# Patient Record
Sex: FEMALE | Race: WHITE | NOT HISPANIC OR LATINO | Employment: PART TIME | URBAN - METROPOLITAN AREA
[De-identification: names, ages, dates, MRNs, and addresses within clinical notes are randomized per-mention and may not be internally consistent; named-entity substitution may affect disease eponyms.]

---

## 2023-01-18 PROBLEM — R73.01 ABNORMAL FASTING GLUCOSE: Status: ACTIVE | Noted: 2023-01-18

## 2023-01-18 PROBLEM — G62.0 CHEMOTHERAPY-INDUCED NEUROPATHY (MULTI): Status: ACTIVE | Noted: 2023-01-18

## 2023-01-18 PROBLEM — F98.8 ADD (ATTENTION DEFICIT DISORDER): Status: ACTIVE | Noted: 2023-01-18

## 2023-01-18 PROBLEM — M54.9 BILATERAL BACK PAIN: Status: ACTIVE | Noted: 2023-01-18

## 2023-01-18 PROBLEM — T45.1X5A CHEMOTHERAPY-INDUCED NEUROPATHY (MULTI): Status: ACTIVE | Noted: 2023-01-18

## 2023-01-18 PROBLEM — R93.89 ABNORMAL CAT SCAN: Status: ACTIVE | Noted: 2023-01-18

## 2023-02-07 PROBLEM — G89.28 OTHER CHRONIC POSTPROCEDURAL PAIN: Status: ACTIVE | Noted: 2023-02-07

## 2023-02-07 PROBLEM — R39.9 UTI SYMPTOMS: Status: ACTIVE | Noted: 2023-02-07

## 2023-02-07 PROBLEM — G89.3 CHRONIC PAIN DUE TO NEOPLASM: Status: ACTIVE | Noted: 2023-02-07

## 2023-02-07 PROBLEM — R20.2 PARESTHESIA: Status: ACTIVE | Noted: 2023-02-07

## 2023-02-07 PROBLEM — M79.18 MYOFASCIAL PAIN SYNDROME: Status: ACTIVE | Noted: 2023-02-07

## 2023-02-07 PROBLEM — H53.9 EPISODE OF VISUAL DISTURBANCE: Status: ACTIVE | Noted: 2023-02-07

## 2023-02-07 PROBLEM — E03.9 HYPOTHYROIDISM: Status: ACTIVE | Noted: 2023-02-07

## 2023-02-07 PROBLEM — R29.898 WEAKNESS OF BOTH HIPS: Status: ACTIVE | Noted: 2023-02-07

## 2023-02-07 PROBLEM — M25.50 JOINT PAIN: Status: ACTIVE | Noted: 2023-02-07

## 2023-02-07 PROBLEM — E78.5 DYSLIPIDEMIA: Status: ACTIVE | Noted: 2023-02-07

## 2023-02-07 PROBLEM — N89.8 VAGINAL DISCHARGE: Status: ACTIVE | Noted: 2023-02-07

## 2023-02-07 RX ORDER — TRAMADOL HYDROCHLORIDE 100 MG/1
1 TABLET, COATED ORAL 4 TIMES DAILY
COMMUNITY
Start: 2021-07-12 | End: 2023-12-06 | Stop reason: WASHOUT

## 2023-02-07 RX ORDER — MULTIVITAMIN
TABLET ORAL
COMMUNITY

## 2023-02-07 RX ORDER — LEVOTHYROXINE SODIUM 50 UG/1
1 TABLET ORAL DAILY
COMMUNITY
Start: 2015-03-10 | End: 2023-04-25 | Stop reason: SDUPTHER

## 2023-02-07 RX ORDER — NALOXONE HYDROCHLORIDE 4 MG/.1ML
4 SPRAY NASAL AS NEEDED
COMMUNITY
Start: 2022-02-23 | End: 2023-12-06 | Stop reason: WASHOUT

## 2023-02-07 RX ORDER — LACTULOSE 10 G/15ML
SOLUTION ORAL
COMMUNITY
Start: 2022-05-17 | End: 2023-02-11 | Stop reason: ENTERED-IN-ERROR

## 2023-02-07 RX ORDER — CARISOPRODOL 350 MG/1
1 TABLET ORAL 3 TIMES DAILY PRN
COMMUNITY
Start: 2022-08-18 | End: 2023-12-06 | Stop reason: WASHOUT

## 2023-02-11 RX ORDER — LACTULOSE 10 G/15ML
SOLUTION ORAL
COMMUNITY
End: 2023-12-06 | Stop reason: WASHOUT

## 2023-03-06 ENCOUNTER — OFFICE VISIT (OUTPATIENT)
Dept: PRIMARY CARE | Facility: CLINIC | Age: 53
End: 2023-03-06
Payer: MEDICAID

## 2023-03-06 VITALS
TEMPERATURE: 97.1 F | HEIGHT: 61 IN | OXYGEN SATURATION: 97 % | WEIGHT: 158.6 LBS | DIASTOLIC BLOOD PRESSURE: 80 MMHG | RESPIRATION RATE: 16 BRPM | BODY MASS INDEX: 29.94 KG/M2 | SYSTOLIC BLOOD PRESSURE: 118 MMHG

## 2023-03-06 DIAGNOSIS — Z13.29 SCREENING FOR ENDOCRINE, METABOLIC AND IMMUNITY DISORDER: ICD-10-CM

## 2023-03-06 DIAGNOSIS — Z23 NEED FOR SHINGLES VACCINE: ICD-10-CM

## 2023-03-06 DIAGNOSIS — T45.1X5A CHEMOTHERAPY-INDUCED NEUROPATHY (MULTI): ICD-10-CM

## 2023-03-06 DIAGNOSIS — Z12.11 COLON CANCER SCREENING: ICD-10-CM

## 2023-03-06 DIAGNOSIS — Z13.228 SCREENING FOR ENDOCRINE, METABOLIC AND IMMUNITY DISORDER: ICD-10-CM

## 2023-03-06 DIAGNOSIS — R53.83 OTHER FATIGUE: ICD-10-CM

## 2023-03-06 DIAGNOSIS — Z23 NEED FOR DIPHTHERIA-TETANUS-PERTUSSIS (TDAP) VACCINE: ICD-10-CM

## 2023-03-06 DIAGNOSIS — G62.0 CHEMOTHERAPY-INDUCED NEUROPATHY (MULTI): ICD-10-CM

## 2023-03-06 DIAGNOSIS — Z13.220 SCREENING, LIPID: ICD-10-CM

## 2023-03-06 DIAGNOSIS — R19.5 POSITIVE COLORECTAL CANCER SCREENING USING COLOGUARD TEST: ICD-10-CM

## 2023-03-06 DIAGNOSIS — Z13.0 SCREENING FOR ENDOCRINE, METABOLIC AND IMMUNITY DISORDER: ICD-10-CM

## 2023-03-06 DIAGNOSIS — Z00.00 WELLNESS EXAMINATION: Primary | ICD-10-CM

## 2023-03-06 DIAGNOSIS — E03.9 ACQUIRED HYPOTHYROIDISM: ICD-10-CM

## 2023-03-06 DIAGNOSIS — Z11.59 ENCOUNTER FOR HEPATITIS C SCREENING TEST FOR LOW RISK PATIENT: ICD-10-CM

## 2023-03-06 PROCEDURE — 99396 PREV VISIT EST AGE 40-64: CPT | Performed by: STUDENT IN AN ORGANIZED HEALTH CARE EDUCATION/TRAINING PROGRAM

## 2023-03-06 PROCEDURE — 1036F TOBACCO NON-USER: CPT | Performed by: STUDENT IN AN ORGANIZED HEALTH CARE EDUCATION/TRAINING PROGRAM

## 2023-03-06 RX ORDER — CLOSTRIDIUM TETANI TOXOID ANTIGEN (FORMALDEHYDE INACTIVATED), CORYNEBACTERIUM DIPHTHERIAE TOXOID ANTIGEN (FORMALDEHYDE INACTIVATED), BORDETELLA PERTUSSIS TOXOID ANTIGEN (GLUTARALDEHYDE INACTIVATED), BORDETELLA PERTUSSIS FILAMENTOUS HEMAGGLUTININ ANTIGEN (FORMALDEHYDE INACTIVATED), BORDETELLA PERTUSSIS PERTACTIN ANTIGEN, AND BORDETELLA PERTUSSIS FIMBRIAE 2/3 ANTIGEN 5; 2; 2.5; 5; 3; 5 [LF]/.5ML; [LF]/.5ML; UG/.5ML; UG/.5ML; UG/.5ML; UG/.5ML
0.5 INJECTION, SUSPENSION INTRAMUSCULAR ONCE
Qty: 0.5 ML | Refills: 0 | Status: SHIPPED | OUTPATIENT
Start: 2023-03-06 | End: 2023-03-06

## 2023-03-06 ASSESSMENT — ENCOUNTER SYMPTOMS
COUGH: 0
FATIGUE: 1
FEVER: 0
SHORTNESS OF BREATH: 0
DYSPHORIC MOOD: 0
PALPITATIONS: 0
ACTIVITY CHANGE: 0
ARTHRALGIAS: 1

## 2023-03-06 ASSESSMENT — PATIENT HEALTH QUESTIONNAIRE - PHQ9
2. FEELING DOWN, DEPRESSED OR HOPELESS: NOT AT ALL
1. LITTLE INTEREST OR PLEASURE IN DOING THINGS: NOT AT ALL
SUM OF ALL RESPONSES TO PHQ9 QUESTIONS 1 AND 2: 0

## 2023-03-06 ASSESSMENT — PAIN SCALES - GENERAL: PAINLEVEL: 4

## 2023-03-06 NOTE — ASSESSMENT & PLAN NOTE
Cologuard ordered 3/6/23  Pap- s/p hysterectomy  Mammogram- s/p mastectomy  Labs- ordered 3/6/23 including TSH  Ordered shingles and Tdap for patient  Declines flu and COVID

## 2023-03-06 NOTE — PROGRESS NOTES
"Subjective   Patient ID: America Tang is a 52 y.o. female who presents for No chief complaint on file..    HPI     53 yo female presents for annual wellness  Overall feels she is doing well no concerns today  Is est with pain management who is caring for her residual neuropathy s/p chemo for breast cancer  Would like to get updated on labs and any testing    Review of Systems   Constitutional:  Positive for fatigue. Negative for activity change and fever.   HENT:  Negative for congestion.    Eyes:         Far sighted   Respiratory:  Negative for cough and shortness of breath.    Cardiovascular:  Negative for chest pain, palpitations and leg swelling.   Endocrine: Negative for polyuria.   Musculoskeletal:  Positive for arthralgias.   Neurological:         Chemo induced neuropathy   Psychiatric/Behavioral:  Negative for dysphoric mood.        Objective   /80 (BP Location: Right arm, Patient Position: Sitting)   Temp 36.2 °C (97.1 °F) (Skin)   Resp 16   Ht 1.549 m (5' 1\")   Wt 71.9 kg (158 lb 9.6 oz)   SpO2 97%   BMI 29.97 kg/m²     Physical Exam  Constitutional:       Appearance: Normal appearance.   HENT:      Head: Normocephalic and atraumatic.      Right Ear: Tympanic membrane normal.      Left Ear: Tympanic membrane normal.      Mouth/Throat:      Mouth: Mucous membranes are moist.   Eyes:      Extraocular Movements: Extraocular movements intact.      Pupils: Pupils are equal, round, and reactive to light.   Cardiovascular:      Rate and Rhythm: Normal rate and regular rhythm.   Pulmonary:      Effort: Pulmonary effort is normal. No respiratory distress.   Musculoskeletal:         General: No swelling.   Skin:     Coloration: Skin is not jaundiced or pale.   Neurological:      General: No focal deficit present.      Mental Status: She is alert and oriented to person, place, and time.       Assessment/Plan     Wellness/ need for immunization/encounter screening colon cancer/encounter screening " labs  Cologuard ordered 3/6/23  Pap- s/p hysterectomy  Mammogram- s/p mastectomy  Labs- ordered 3/6/23 including TSH  Ordered shingles and Tdap for patient  Declines flu and COVID    Hypothyroidism- update labs, titrate levothyroxine as appropriate    Chemo induced neuropathy-Patient est with pain management handling rx for tramadol and soma  UDS updated 2/2023      Follow up in 6 months sooner if any issues

## 2023-03-25 LAB — NONINV COLON CA DNA+OCC BLD SCRN STL QL: POSITIVE

## 2023-03-27 ENCOUNTER — TELEPHONE (OUTPATIENT)
Dept: PRIMARY CARE | Facility: CLINIC | Age: 53
End: 2023-03-27
Payer: MEDICAID

## 2023-03-27 NOTE — TELEPHONE ENCOUNTER
----- Message from Kriss Oconnell DO sent at 3/26/2023  7:30 AM EDT -----  Cologuard testing returned positive. What this means is there was an abnormality detected, since cologuard cannot directly visuallize it cannot tell us what the concern is (blood, polyps, etc) so we need to perform a colonoscopy now to follow up on this positive result and figure out what is triggering the positive result. Ordered.

## 2023-04-25 ENCOUNTER — TELEPHONE (OUTPATIENT)
Dept: PRIMARY CARE | Facility: CLINIC | Age: 53
End: 2023-04-25
Payer: MEDICAID

## 2023-04-25 DIAGNOSIS — E03.9 ACQUIRED HYPOTHYROIDISM: Primary | ICD-10-CM

## 2023-04-26 RX ORDER — LEVOTHYROXINE SODIUM 50 UG/1
50 TABLET ORAL
Qty: 90 TABLET | Refills: 1 | Status: SHIPPED | OUTPATIENT
Start: 2023-04-26 | End: 2023-09-06 | Stop reason: WASHOUT

## 2023-05-12 LAB — ALCOHOL (MG/DL) IN URINE: <10 MG/DL

## 2023-05-15 LAB
11-NOR-9-CARBOXY-THC, URN, QUANT: 94 NG/ML
ETHYL GLUCURONIDE SCREEN: POSITIVE NG/ML

## 2023-05-16 LAB
6-ACETYLMORPHINE: <25 NG/ML
7-AMINOCLONAZEPAM: <25 NG/ML
ALPHA-HYDROXYALPRAZOLAM: <25 NG/ML
ALPHA-HYDROXYMIDAZOLAM: <25 NG/ML
ALPRAZOLAM: <25 NG/ML
AMPHETAMINE (PRESENCE) IN URINE BY SCREEN METHOD: ABNORMAL
BARBITURATES PRESENCE IN URINE BY SCREEN METHOD: ABNORMAL
CANNABINOIDS IN URINE BY SCREEN METHOD: ABNORMAL
CHLORDIAZEPOXIDE: <25 NG/ML
CLONAZEPAM: <25 NG/ML
COCAINE (PRESENCE) IN URINE BY SCREEN METHOD: ABNORMAL
CODEINE: <50 NG/ML
CREATINE, URINE FOR DRUG: 198.6 MG/DL
DIAZEPAM: <25 NG/ML
DRUG SCREEN COMMENT URINE: ABNORMAL
EDDP: <25 NG/ML
FENTANYL CONFIRMATION, URINE: <2.5 NG/ML
HYDROCODONE: <25 NG/ML
HYDROMORPHONE: <25 NG/ML
LORAZEPAM: <25 NG/ML
METHADONE CONFIRMATION,URINE: <25 NG/ML
MIDAZOLAM: <25 NG/ML
MORPHINE URINE: <50 NG/ML
N-DESMETHYLTAPENTADOL: <25 NG/ML
NORDIAZEPAM: <25 NG/ML
NORFENTANYL: <2.5 NG/ML
NORHYDROCODONE: <25 NG/ML
NOROXYCODONE: <25 NG/ML
O-DESMETHYLTRAMADOL: >1000 NG/ML
OXAZEPAM: <25 NG/ML
OXYCODONE: <25 NG/ML
OXYMORPHONE: <25 NG/ML
PHENCYCLIDINE (PRESENCE) IN URINE BY SCREEN METHOD: ABNORMAL
TAPENTADOL: <25 NG/ML
TEMAZEPAM: <25 NG/ML
TRAMADOL: >1000 NG/ML
ZOLPIDEM METABOLITE (ZCA): <25 NG/ML
ZOLPIDEM: <25 NG/ML

## 2023-05-17 LAB
BUPRENORPHINE ,URINE: <2 NG/ML
BUPRENORPHINE GLUC, URINE: <5 NG/ML
ETHYL GLUCURONIDE: 2032 NG/ML
ETHYL SULFATE,U: 1719 NG/ML
NALOXONE, URINE: <100 NG/ML
NORBUPRENORPHINE GLUC,URINE: <5 NG/ML
NORBUPRENORPHINE, URINE: <2 NG/ML

## 2023-08-03 ENCOUNTER — PATIENT OUTREACH (OUTPATIENT)
Dept: PRIMARY CARE | Facility: CLINIC | Age: 53
End: 2023-08-03
Payer: MEDICAID

## 2023-08-04 NOTE — PROGRESS NOTES
Discharge Facility: [facility]   Discharge Diagnosis:  Final Discharge Diagnoses: Impression 1: Paroxysmal supraventricular tachycardia  Impression 2: Elevated troponin  Impression 3: Peripheral neuropathy  Impression 4: Chronic pain  Impression 5: DVT prophylaxis     Admission Date:  8-1-23  Discharge Date: 8-2-23        2 attempts were made to reach patient to assess needs.   No return call as of this note.   If patient schedules follow up within 14 days of discharge, visit is TCM billable.  Message sent to practice clinical pool to reach out to patient and schedule an appointment within 7-13 days from discharge date.    If patient meets criteria for moderately complex & has follow-up within 14 days-can bill [phone].   If patient meets criteria for highly complex & has follow-up visit within 7 days-can bill [phone].    *virtual follow up needs modifier added (95 or GT)   *AWV AND TCM CAN BE BILLED TOGETHER WITH 25 MODIFIER    Specialist Appointment Date:   Hospital Encounter and Summary: Linked  See discharge assessment below for further details  Reggie Sales LPN

## 2023-08-09 LAB — ALCOHOL (MG/DL) IN URINE: 43 MG/DL

## 2023-08-14 LAB
6-ACETYLMORPHINE: <25 NG/ML
7-AMINOCLONAZEPAM: <25 NG/ML
ALPHA-HYDROXYALPRAZOLAM: <25 NG/ML
ALPHA-HYDROXYMIDAZOLAM: <25 NG/ML
ALPRAZOLAM: <25 NG/ML
AMPHETAMINE (PRESENCE) IN URINE BY SCREEN METHOD: ABNORMAL
BARBITURATES PRESENCE IN URINE BY SCREEN METHOD: ABNORMAL
BUPRENORPHINE ,URINE: <2 NG/ML
BUPRENORPHINE GLUC, URINE: <5 NG/ML
CANNABINOIDS IN URINE BY SCREEN METHOD: ABNORMAL
CHLORDIAZEPOXIDE: <25 NG/ML
CLONAZEPAM: <25 NG/ML
COCAINE (PRESENCE) IN URINE BY SCREEN METHOD: ABNORMAL
CODEINE: <50 NG/ML
CREATINE, URINE FOR DRUG: 191 MG/DL
DIAZEPAM: <25 NG/ML
DRUG SCREEN COMMENT URINE: ABNORMAL
EDDP: <25 NG/ML
FENTANYL CONFIRMATION, URINE: <2.5 NG/ML
HYDROCODONE: <25 NG/ML
HYDROMORPHONE: <25 NG/ML
LORAZEPAM: <25 NG/ML
METHADONE CONFIRMATION,URINE: <25 NG/ML
MIDAZOLAM: <25 NG/ML
MORPHINE URINE: <50 NG/ML
N-DESMETHYLTAPENTADOL: <25 NG/ML
NALOXONE, URINE: <100 NG/ML
NORBUPRENORPHINE GLUC,URINE: <5 NG/ML
NORBUPRENORPHINE, URINE: <2 NG/ML
NORDIAZEPAM: <25 NG/ML
NORFENTANYL: <2.5 NG/ML
NORHYDROCODONE: <25 NG/ML
NOROXYCODONE: <25 NG/ML
O-DESMETHYLTRAMADOL: >1000 NG/ML
OXAZEPAM: <25 NG/ML
OXYCODONE: <25 NG/ML
OXYMORPHONE: <25 NG/ML
PHENCYCLIDINE (PRESENCE) IN URINE BY SCREEN METHOD: ABNORMAL
TAPENTADOL: <25 NG/ML
TEMAZEPAM: <25 NG/ML
TRAMADOL: >1000 NG/ML
ZOLPIDEM METABOLITE (ZCA): <25 NG/ML
ZOLPIDEM: <25 NG/ML

## 2023-08-18 ENCOUNTER — PATIENT OUTREACH (OUTPATIENT)
Dept: PRIMARY CARE | Facility: CLINIC | Age: 53
End: 2023-08-18
Payer: MEDICAID

## 2023-08-18 NOTE — PROGRESS NOTES
Unable to reach patient for call back after patient's follow up appointment with PCP.   LVM with call back number for patient to call if needed   If no voicemail available call attempts x 2 were made to contact the patient to assist with any questions or concerns patient may have.    Reggie Sales LPN

## 2023-08-28 ENCOUNTER — LAB (OUTPATIENT)
Dept: LAB | Facility: LAB | Age: 53
End: 2023-08-28
Payer: MEDICAID

## 2023-08-28 DIAGNOSIS — Z13.228 SCREENING FOR ENDOCRINE, METABOLIC AND IMMUNITY DISORDER: ICD-10-CM

## 2023-08-28 DIAGNOSIS — Z13.220 SCREENING, LIPID: ICD-10-CM

## 2023-08-28 DIAGNOSIS — R53.83 OTHER FATIGUE: ICD-10-CM

## 2023-08-28 DIAGNOSIS — Z11.59 ENCOUNTER FOR HEPATITIS C SCREENING TEST FOR LOW RISK PATIENT: ICD-10-CM

## 2023-08-28 DIAGNOSIS — Z13.29 SCREENING FOR ENDOCRINE, METABOLIC AND IMMUNITY DISORDER: ICD-10-CM

## 2023-08-28 DIAGNOSIS — Z13.0 SCREENING FOR ENDOCRINE, METABOLIC AND IMMUNITY DISORDER: ICD-10-CM

## 2023-08-28 DIAGNOSIS — E03.9 ACQUIRED HYPOTHYROIDISM: ICD-10-CM

## 2023-08-28 LAB
ALANINE AMINOTRANSFERASE (SGPT) (U/L) IN SER/PLAS: 50 U/L (ref 7–45)
ALBUMIN (G/DL) IN SER/PLAS: 4.3 G/DL (ref 3.4–5)
ALKALINE PHOSPHATASE (U/L) IN SER/PLAS: 47 U/L (ref 33–110)
ANION GAP IN SER/PLAS: 13 MMOL/L (ref 10–20)
ASPARTATE AMINOTRANSFERASE (SGOT) (U/L) IN SER/PLAS: 61 U/L (ref 9–39)
BILIRUBIN TOTAL (MG/DL) IN SER/PLAS: 0.6 MG/DL (ref 0–1.2)
CALCIUM (MG/DL) IN SER/PLAS: 9.5 MG/DL (ref 8.6–10.3)
CARBON DIOXIDE, TOTAL (MMOL/L) IN SER/PLAS: 28 MMOL/L (ref 21–32)
CHLORIDE (MMOL/L) IN SER/PLAS: 106 MMOL/L (ref 98–107)
CHOLESTEROL (MG/DL) IN SER/PLAS: 238 MG/DL (ref 0–199)
CHOLESTEROL IN HDL (MG/DL) IN SER/PLAS: 82.9 MG/DL
CHOLESTEROL/HDL RATIO: 2.9
COBALAMIN (VITAMIN B12) (PG/ML) IN SER/PLAS: 234 PG/ML (ref 211–911)
CREATININE (MG/DL) IN SER/PLAS: 0.74 MG/DL (ref 0.5–1.05)
ERYTHROCYTE DISTRIBUTION WIDTH (RATIO) BY AUTOMATED COUNT: 13.2 % (ref 11.5–14.5)
ERYTHROCYTE MEAN CORPUSCULAR HEMOGLOBIN CONCENTRATION (G/DL) BY AUTOMATED: 31.7 G/DL (ref 32–36)
ERYTHROCYTE MEAN CORPUSCULAR VOLUME (FL) BY AUTOMATED COUNT: 102 FL (ref 80–100)
ERYTHROCYTES (10*6/UL) IN BLOOD BY AUTOMATED COUNT: 4.05 X10E12/L (ref 4–5.2)
ESTIMATED AVERAGE GLUCOSE FOR HBA1C: 108 MG/DL
GFR FEMALE: >90 ML/MIN/1.73M2
GLUCOSE (MG/DL) IN SER/PLAS: 78 MG/DL (ref 74–99)
HEMATOCRIT (%) IN BLOOD BY AUTOMATED COUNT: 41.3 % (ref 36–46)
HEMOGLOBIN (G/DL) IN BLOOD: 13.1 G/DL (ref 12–16)
HEMOGLOBIN A1C/HEMOGLOBIN TOTAL IN BLOOD: 5.4 %
HEPATITIS C VIRUS AB PRESENCE IN SERUM: NONREACTIVE
LDL: 135 MG/DL (ref 0–99)
LEUKOCYTES (10*3/UL) IN BLOOD BY AUTOMATED COUNT: 4 X10E9/L (ref 4.4–11.3)
PLATELETS (10*3/UL) IN BLOOD AUTOMATED COUNT: 238 X10E9/L (ref 150–450)
POTASSIUM (MMOL/L) IN SER/PLAS: 4 MMOL/L (ref 3.5–5.3)
PROTEIN TOTAL: 6.5 G/DL (ref 6.4–8.2)
SODIUM (MMOL/L) IN SER/PLAS: 143 MMOL/L (ref 136–145)
THYROTROPIN (MIU/L) IN SER/PLAS BY DETECTION LIMIT <= 0.05 MIU/L: 1.08 MIU/L (ref 0.44–3.98)
TRIGLYCERIDE (MG/DL) IN SER/PLAS: 99 MG/DL (ref 0–149)
UREA NITROGEN (MG/DL) IN SER/PLAS: 20 MG/DL (ref 6–23)
VLDL: 20 MG/DL (ref 0–40)

## 2023-08-28 PROCEDURE — 86803 HEPATITIS C AB TEST: CPT

## 2023-08-28 PROCEDURE — 36415 COLL VENOUS BLD VENIPUNCTURE: CPT

## 2023-08-28 PROCEDURE — 85027 COMPLETE CBC AUTOMATED: CPT

## 2023-08-28 PROCEDURE — 80053 COMPREHEN METABOLIC PANEL: CPT

## 2023-08-28 PROCEDURE — 80061 LIPID PANEL: CPT

## 2023-08-28 PROCEDURE — 84443 ASSAY THYROID STIM HORMONE: CPT

## 2023-08-28 PROCEDURE — 82652 VIT D 1 25-DIHYDROXY: CPT

## 2023-08-28 PROCEDURE — 83036 HEMOGLOBIN GLYCOSYLATED A1C: CPT

## 2023-08-28 PROCEDURE — 82607 VITAMIN B-12: CPT

## 2023-09-01 LAB — VITAMIN D 1,25-DIHYDROXY: 39.5 PG/ML (ref 19.9–79.3)

## 2023-09-06 ENCOUNTER — OFFICE VISIT (OUTPATIENT)
Dept: PRIMARY CARE | Facility: CLINIC | Age: 53
End: 2023-09-06
Payer: MEDICAID

## 2023-09-06 VITALS
HEIGHT: 61 IN | BODY MASS INDEX: 28.58 KG/M2 | WEIGHT: 151.4 LBS | SYSTOLIC BLOOD PRESSURE: 118 MMHG | OXYGEN SATURATION: 96 % | HEART RATE: 99 BPM | RESPIRATION RATE: 16 BRPM | DIASTOLIC BLOOD PRESSURE: 78 MMHG

## 2023-09-06 DIAGNOSIS — E03.9 ACQUIRED HYPOTHYROIDISM: ICD-10-CM

## 2023-09-06 DIAGNOSIS — I47.10 SVT (SUPRAVENTRICULAR TACHYCARDIA) (CMS-HCC): Primary | ICD-10-CM

## 2023-09-06 PROBLEM — Z85.3 HX OF BREAST CANCER: Status: ACTIVE | Noted: 2023-09-06

## 2023-09-06 PROCEDURE — 99214 OFFICE O/P EST MOD 30 MIN: CPT | Performed by: STUDENT IN AN ORGANIZED HEALTH CARE EDUCATION/TRAINING PROGRAM

## 2023-09-06 PROCEDURE — 1036F TOBACCO NON-USER: CPT | Performed by: STUDENT IN AN ORGANIZED HEALTH CARE EDUCATION/TRAINING PROGRAM

## 2023-09-06 RX ORDER — LEVOTHYROXINE SODIUM 75 UG/1
75 TABLET ORAL DAILY
Qty: 90 TABLET | Refills: 0 | Status: SHIPPED | OUTPATIENT
Start: 2023-09-06 | End: 2023-12-06 | Stop reason: SDUPTHER

## 2023-09-06 RX ORDER — GABAPENTIN 300 MG/1
300 CAPSULE ORAL
COMMUNITY
Start: 2023-08-09 | End: 2023-12-06 | Stop reason: WASHOUT

## 2023-09-06 RX ORDER — LEVOTHYROXINE SODIUM 50 UG/1
75 TABLET ORAL
Qty: 90 TABLET | Refills: 1 | Status: CANCELLED | OUTPATIENT
Start: 2023-09-06

## 2023-09-06 ASSESSMENT — ANXIETY QUESTIONNAIRES
5. BEING SO RESTLESS THAT IT IS HARD TO SIT STILL: NOT AT ALL
6. BECOMING EASILY ANNOYED OR IRRITABLE: NOT AT ALL
1. FEELING NERVOUS, ANXIOUS, OR ON EDGE: NOT AT ALL
3. WORRYING TOO MUCH ABOUT DIFFERENT THINGS: NOT AT ALL
2. NOT BEING ABLE TO STOP OR CONTROL WORRYING: NOT AT ALL
GAD7 TOTAL SCORE: 0
7. FEELING AFRAID AS IF SOMETHING AWFUL MIGHT HAPPEN: NOT AT ALL
IF YOU CHECKED OFF ANY PROBLEMS ON THIS QUESTIONNAIRE, HOW DIFFICULT HAVE THESE PROBLEMS MADE IT FOR YOU TO DO YOUR WORK, TAKE CARE OF THINGS AT HOME, OR GET ALONG WITH OTHER PEOPLE: NOT DIFFICULT AT ALL
4. TROUBLE RELAXING: NOT AT ALL

## 2023-09-06 ASSESSMENT — ENCOUNTER SYMPTOMS
FACIAL ASYMMETRY: 0
ARTHRALGIAS: 1
DEPRESSION: 0
PALPITATIONS: 0
CONFUSION: 0
SHORTNESS OF BREATH: 0
COUGH: 0
LOSS OF SENSATION IN FEET: 0
OCCASIONAL FEELINGS OF UNSTEADINESS: 0

## 2023-09-06 ASSESSMENT — PATIENT HEALTH QUESTIONNAIRE - PHQ9
2. FEELING DOWN, DEPRESSED OR HOPELESS: NOT AT ALL
SUM OF ALL RESPONSES TO PHQ9 QUESTIONS 1 AND 2: 0
1. LITTLE INTEREST OR PLEASURE IN DOING THINGS: NOT AT ALL

## 2023-09-06 NOTE — ASSESSMENT & PLAN NOTE
Given large change and symptoms requiring hospitalization schedule 3 month recheck  Continue at 75mcg dose

## 2023-09-06 NOTE — PROGRESS NOTES
"Patient Name:  America Tang  MRN:  [id number]  :  1970    Subjective   Patient ID: America Tang is a 52 y.o. female who presents for Follow-up.    HPI     53 yo female presents for follow up    ED visit 23 for SVT seen by EMT  Given x1 dose of adenosine with resolution  Elevated trop  Patient admitted to the hospital with cardiology consulted  Started on metoprolol  Levothyroxine dose changed from 50 to 75  TSH rechecked before this visit and WNL    Has not been taking metoprolol and no further symptoms               Component Ref Range & Units 9 d ago  (23) 1 mo ago  (23) 2 yr ago  (21) 2 yr ago  (20) 3 yr ago  (20) 4 yr ago  (19) 4 yr ago  (18)   TSH 0.44 - 3.98 mIU/L 1.08 23.98 High  CM 9.02 High  CM 55.00 High  CM 7.17 High  CM       Review of Systems   Eyes:  Negative for visual disturbance.   Respiratory:  Negative for cough and shortness of breath.    Cardiovascular:  Negative for chest pain, palpitations and leg swelling.   Musculoskeletal:  Positive for arthralgias.   Allergic/Immunologic: Negative for immunocompromised state.   Neurological:  Negative for facial asymmetry.   Psychiatric/Behavioral:  Negative for confusion.      Objective   /78   Pulse 99   Resp 16   Ht 1.549 m (5' 1\")   Wt 68.7 kg (151 lb 6.4 oz)   SpO2 96%   BMI 28.61 kg/m²     Physical Exam  Constitutional:       Appearance: Normal appearance.   Cardiovascular:      Rate and Rhythm: Normal rate and regular rhythm.   Pulmonary:      Effort: Pulmonary effort is normal.   Neurological:      General: No focal deficit present.      Mental Status: She is alert and oriented to person, place, and time.   Psychiatric:         Mood and Affect: Mood normal.         Behavior: Behavior normal.     Assessment/Plan   Problem List Items Addressed This Visit       Hypothyroidism (Chronic)     Given large change and symptoms requiring hospitalization schedule 3 month recheck  Continue at 75mcg " dose         Relevant Medications    levothyroxine (Synthroid, Levoxyl) 75 mcg tablet    Other Relevant Orders    TSH with reflex to Free T4 if abnormal    Follow Up In [facility] - PCP - Established    SVT (supraventricular tachycardia) (CMS/HCC) - Primary (Chronic)     Continue to closely monitor TSH and levothyroxine dosing  Patient stopped metoprolol  HR borderline in office 99  Close follow up continue, low threshold to add back, patient aware         Relevant Medications    levothyroxine (Synthroid, Levoxyl) 75 mcg tablet    Other Relevant Orders    TSH with reflex to Free T4 if abnormal    Follow Up In [facility] - PCP - Established

## 2023-09-06 NOTE — ASSESSMENT & PLAN NOTE
Continue to closely monitor TSH and levothyroxine dosing  Patient stopped metoprolol  HR borderline in office 99  Close follow up continue, low threshold to add back, patient aware

## 2023-12-06 ENCOUNTER — LAB (OUTPATIENT)
Dept: LAB | Facility: LAB | Age: 53
End: 2023-12-06
Payer: COMMERCIAL

## 2023-12-06 ENCOUNTER — OFFICE VISIT (OUTPATIENT)
Dept: PRIMARY CARE | Facility: CLINIC | Age: 53
End: 2023-12-06
Payer: COMMERCIAL

## 2023-12-06 VITALS
SYSTOLIC BLOOD PRESSURE: 116 MMHG | HEART RATE: 83 BPM | OXYGEN SATURATION: 97 % | DIASTOLIC BLOOD PRESSURE: 70 MMHG | WEIGHT: 164 LBS | RESPIRATION RATE: 16 BRPM | HEIGHT: 61 IN | BODY MASS INDEX: 30.96 KG/M2

## 2023-12-06 DIAGNOSIS — I47.10 SVT (SUPRAVENTRICULAR TACHYCARDIA) (CMS-HCC): ICD-10-CM

## 2023-12-06 DIAGNOSIS — E03.9 ACQUIRED HYPOTHYROIDISM: ICD-10-CM

## 2023-12-06 LAB — TSH SERPL-ACNC: 3.13 MIU/L (ref 0.44–3.98)

## 2023-12-06 PROCEDURE — 99214 OFFICE O/P EST MOD 30 MIN: CPT | Performed by: STUDENT IN AN ORGANIZED HEALTH CARE EDUCATION/TRAINING PROGRAM

## 2023-12-06 PROCEDURE — 1036F TOBACCO NON-USER: CPT | Performed by: STUDENT IN AN ORGANIZED HEALTH CARE EDUCATION/TRAINING PROGRAM

## 2023-12-06 PROCEDURE — 84443 ASSAY THYROID STIM HORMONE: CPT

## 2023-12-06 PROCEDURE — 36415 COLL VENOUS BLD VENIPUNCTURE: CPT

## 2023-12-06 ASSESSMENT — ENCOUNTER SYMPTOMS
OCCASIONAL FEELINGS OF UNSTEADINESS: 0
COUGH: 0
ARTHRALGIAS: 1
LOSS OF SENSATION IN FEET: 0
SHORTNESS OF BREATH: 0
PALPITATIONS: 0
CONFUSION: 0
POLYPHAGIA: 0
DECREASED CONCENTRATION: 0

## 2023-12-06 ASSESSMENT — PATIENT HEALTH QUESTIONNAIRE - PHQ9
1. LITTLE INTEREST OR PLEASURE IN DOING THINGS: NOT AT ALL
SUM OF ALL RESPONSES TO PHQ9 QUESTIONS 1 AND 2: 0
2. FEELING DOWN, DEPRESSED OR HOPELESS: NOT AT ALL

## 2023-12-06 NOTE — PROGRESS NOTES
"Patient Name:  America Tang  MRN:  [id number]  :  1970    Subjective   Patient ID: America Tang is a 53 y.o. female who presents for Follow-up.    HPI     54 yo female presents for follow up on thyroid lab review  Did not complete thyroid labs    No issues with SVT  No palpitations  Not taking BB     Has weaned herself off the tramadol  Has some withdrawal initially but not doing well      Review of Systems   Eyes:  Negative for visual disturbance.   Respiratory:  Negative for cough and shortness of breath.    Cardiovascular:  Negative for chest pain, palpitations and leg swelling.   Endocrine: Negative for cold intolerance, heat intolerance, polyphagia and polyuria.   Musculoskeletal:  Positive for arthralgias.   Allergic/Immunologic: Negative for immunocompromised state.   Psychiatric/Behavioral:  Negative for confusion and decreased concentration.      Objective   /70   Pulse 83   Resp 16   Ht 1.549 m (5' 1\")   Wt 74.4 kg (164 lb)   SpO2 97%   BMI 30.99 kg/m²     Physical Exam  Constitutional:       Appearance: Normal appearance.   HENT:      Head: Normocephalic and atraumatic.   Eyes:      Extraocular Movements: Extraocular movements intact.   Cardiovascular:      Rate and Rhythm: Normal rate and regular rhythm.   Pulmonary:      Effort: Pulmonary effort is normal. No respiratory distress.   Musculoskeletal:      Right lower leg: No edema.      Left lower leg: No edema.   Skin:     Coloration: Skin is not jaundiced or pale.   Neurological:      General: No focal deficit present.      Mental Status: She is alert and oriented to person, place, and time.   Psychiatric:         Mood and Affect: Mood normal.         Behavior: Behavior normal.         Thought Content: Thought content normal.         Judgment: Judgment normal.     Assessment/Plan   Problem List Items Addressed This Visit             ICD-10-CM    Hypothyroidism (Chronic) E03.9    Relevant Orders    Follow Up In [facility] [facility] - PCP - Established    Tsh With Reflex To Free T4 If Abnormal    SVT (supraventricular tachycardia) (Chronic) I47.10     No further symptoms of SVT, HR well controlled in office not on BB    One more recheck of thryoid function if stable move to [id number] checks

## 2023-12-07 ENCOUNTER — TELEPHONE (OUTPATIENT)
Dept: PRIMARY CARE | Facility: CLINIC | Age: 53
End: 2023-12-07
Payer: COMMERCIAL

## 2023-12-14 ENCOUNTER — TELEPHONE (OUTPATIENT)
Dept: SCHEDULING | Age: 53
End: 2023-12-14
Payer: COMMERCIAL

## 2023-12-14 DIAGNOSIS — Z85.3 HX OF BREAST CANCER: ICD-10-CM

## 2023-12-15 ENCOUNTER — TELEPHONE (OUTPATIENT)
Dept: PRIMARY CARE | Facility: CLINIC | Age: 53
End: 2023-12-15
Payer: COMMERCIAL

## 2023-12-15 DIAGNOSIS — E03.9 ACQUIRED HYPOTHYROIDISM: Primary | ICD-10-CM

## 2023-12-18 RX ORDER — LEVOTHYROXINE SODIUM 75 UG/1
75 TABLET ORAL DAILY
Qty: 30 TABLET | Refills: 0 | Status: SHIPPED | OUTPATIENT
Start: 2023-12-18 | End: 2024-01-24 | Stop reason: SDUPTHER

## 2024-01-04 DIAGNOSIS — Z85.3 HX OF BREAST CANCER: Primary | ICD-10-CM

## 2024-01-08 ENCOUNTER — OFFICE VISIT (OUTPATIENT)
Dept: HEMATOLOGY/ONCOLOGY | Facility: CLINIC | Age: 54
End: 2024-01-08
Payer: COMMERCIAL

## 2024-01-08 VITALS
SYSTOLIC BLOOD PRESSURE: 115 MMHG | DIASTOLIC BLOOD PRESSURE: 78 MMHG | RESPIRATION RATE: 16 BRPM | HEIGHT: 61 IN | BODY MASS INDEX: 29.89 KG/M2 | TEMPERATURE: 96.8 F | WEIGHT: 158.29 LBS | OXYGEN SATURATION: 97 % | HEART RATE: 88 BPM

## 2024-01-08 DIAGNOSIS — Z85.3 HX OF BREAST CANCER: ICD-10-CM

## 2024-01-08 LAB
ALBUMIN SERPL BCP-MCNC: 4.2 G/DL (ref 3.4–5)
ALP SERPL-CCNC: 40 U/L (ref 33–110)
ALT SERPL W P-5'-P-CCNC: 8 U/L (ref 7–45)
ANION GAP SERPL CALC-SCNC: 9 MMOL/L (ref 10–20)
AST SERPL W P-5'-P-CCNC: 13 U/L (ref 9–39)
BASOPHILS # BLD AUTO: 0.03 X10*3/UL (ref 0–0.1)
BASOPHILS NFR BLD AUTO: 0.9 %
BILIRUB SERPL-MCNC: 0.6 MG/DL (ref 0–1.2)
BUN SERPL-MCNC: 14 MG/DL (ref 6–23)
CALCIUM SERPL-MCNC: 9 MG/DL (ref 8.6–10.3)
CHLORIDE SERPL-SCNC: 105 MMOL/L (ref 98–107)
CO2 SERPL-SCNC: 29 MMOL/L (ref 21–32)
CREAT SERPL-MCNC: 0.73 MG/DL (ref 0.5–1.05)
EGFRCR SERPLBLD CKD-EPI 2021: >90 ML/MIN/1.73M*2
EOSINOPHIL # BLD AUTO: 0.09 X10*3/UL (ref 0–0.7)
EOSINOPHIL NFR BLD AUTO: 2.8 %
ERYTHROCYTE [DISTWIDTH] IN BLOOD BY AUTOMATED COUNT: 13 % (ref 11.5–14.5)
GLUCOSE SERPL-MCNC: 90 MG/DL (ref 74–99)
HCT VFR BLD AUTO: 38.1 % (ref 36–46)
HGB BLD-MCNC: 12.1 G/DL (ref 12–16)
IMM GRANULOCYTES # BLD AUTO: 0.01 X10*3/UL (ref 0–0.7)
IMM GRANULOCYTES NFR BLD AUTO: 0.3 % (ref 0–0.9)
LYMPHOCYTES # BLD AUTO: 1.01 X10*3/UL (ref 1.2–4.8)
LYMPHOCYTES NFR BLD AUTO: 31.7 %
MCH RBC QN AUTO: 31.1 PG (ref 26–34)
MCHC RBC AUTO-ENTMCNC: 31.8 G/DL (ref 32–36)
MCV RBC AUTO: 98 FL (ref 80–100)
MONOCYTES # BLD AUTO: 0.45 X10*3/UL (ref 0.1–1)
MONOCYTES NFR BLD AUTO: 14.1 %
NEUTROPHILS # BLD AUTO: 1.6 X10*3/UL (ref 1.2–7.7)
NEUTROPHILS NFR BLD AUTO: 50.2 %
PLATELET # BLD AUTO: 241 X10*3/UL (ref 150–450)
POTASSIUM SERPL-SCNC: 3.6 MMOL/L (ref 3.5–5.3)
PROT SERPL-MCNC: 6.9 G/DL (ref 6.4–8.2)
RBC # BLD AUTO: 3.89 X10*6/UL (ref 4–5.2)
SODIUM SERPL-SCNC: 139 MMOL/L (ref 136–145)
WBC # BLD AUTO: 3.2 X10*3/UL (ref 4.4–11.3)

## 2024-01-08 PROCEDURE — 36415 COLL VENOUS BLD VENIPUNCTURE: CPT | Performed by: INTERNAL MEDICINE

## 2024-01-08 PROCEDURE — 1036F TOBACCO NON-USER: CPT | Performed by: INTERNAL MEDICINE

## 2024-01-08 PROCEDURE — 99214 OFFICE O/P EST MOD 30 MIN: CPT | Performed by: INTERNAL MEDICINE

## 2024-01-08 PROCEDURE — 80053 COMPREHEN METABOLIC PANEL: CPT | Performed by: INTERNAL MEDICINE

## 2024-01-08 PROCEDURE — 85025 COMPLETE CBC W/AUTO DIFF WBC: CPT | Performed by: INTERNAL MEDICINE

## 2024-01-08 ASSESSMENT — PAIN SCALES - GENERAL: PAINLEVEL: 3

## 2024-01-08 NOTE — PATIENT INSTRUCTIONS
Return for follow up with Dr. Pantoja in 6 months.     Lab appointments are no longer scheduled. Please arrive at least 15 minutes before scheduled appointment time for blood work.

## 2024-01-08 NOTE — PROGRESS NOTES
Patient Visit Information:   Visit Type: Follow Up Visit      Cancer History:          Breast         AJCC Edition: 7th (AJCC), Diagnosis Date: 09-Sep-2015, IIIA, T3 pN2 M0      Treatment Synopsis:    1. Right breast cancer  Palpable mass in right breast on exam September 17, 2015  Lumpectomy with sentinal lymph nodes, Stage IIIA T3, N2, M0, triple negative  MUGA scan showed low ejection fraction of 47%, 2D echocardiogram with EF 55%  Adjuvant chemotherapy with Adriamycin, Cytoxan x4 followed by Taxotere/carboplatin x4     2. Left breast cancer  Diagnosed July 27, 2009, Stage I, T1b, N0, M0  Triple negative  Partial mastectomy  Adjuvant chemotherapy with Adriamycin, Cytoxan and Taxane     BRCA positive  Underwent hysterectomy and bilateral oophorectomy  Bilateral mastectomy        History of Present Illness:      ID Statement:    AMERICA TANG is a 51 year old Female        Chief Complaint: Office visit for follow-up of breast  cancer.   Interval History:    America Tang is here today for interval follow-up and survivorship visit for breast cancer. She reports she is overall doing well. She has chronic arthralgias and  nerve pain related to bilateral mastectomy - has been following with pain management with good control. She denies any shortness of breath, chest pain, abdominal pain, constipation. No skin changes, itching, rashes. Denies bleeding indications, fevers,  chills, night sweats, unexplained weight loss or any other significant concerns. Reports she completes her own chest wall exams and denies any areas of changes or concern.  Patient was admitted in the hospital in August 2023 because of chest pain workup was negative.  Patient is active doing some regular exercise        Review of Systems:   Review of Systems:    Review of systems:   Constitutional: Feeling well, no weakness, fatigue.  Head and neck: occasional headaches.    Cardiac: no chest pain or palpitations.   Pulmonary: no cough or  shortness of breath.    GI: appetite is fair weight is stable. Denies abdominal pain, n/v, c/d.   : no frequency urgency.    GYN: she is post oophorectomy.    Neuromuscular: tingling and numbness in the fingers and toes.   Musculocutaneous: she has pains in her joints and muscles. chronic and stable.   Endocrine: no heat or cold intolerance.    Skin: no rash or itching        Allergies and Intolerances:       Allergies:         Bactrim: Drug, Rash, Itching, Active         sulfamethoxazole: Drug, Unknown, Active         sulfa drugs: Drug Category, Unknown, Active     Outpatient Medication Profile:  * Patient Currently Takes Medications as of 16-May-2022 16:08 documented in Structured Notes         lactulose 10 g/15 mL oral and rectal liquid : 30 milliliter(s) orally 1 to 2 times a day, As Needed , Start Date: 17-May-2022         traMADol 100 mg oral tablet: 1 tab(s) orally every 6 hours as needed  x 30 days , Start Date: 25-Jan-2022         levothyroxine 75 mcg (0.075 mg) oral capsule: 1 cap(s) orally once a day             Medical History:         Chronic pain of multiple joints: ICD-10: M25.50, Status:  Active         Chronic joint pain: ICD-10: M25.50, Status: Active         Neuropathic pain: ICD-10: M79.2, Status: Active         Arthritis: ICD-10: M19.90, Status: Active         Insomnia: ICD-10: G47.00, Status: Active         Neutropenia: ICD-10: D70.9, Status: Active         Drug-induced neutropenia: ICD-10: D70.2, Status: Active         Attention deficit: ICD-10: R41.840, Status: Active         Breast cancer: ICD-10: C50.919, Status: Active       Surg History:         History of bilateral mastectomy: ICD-10: Z90.13, Status:  Active         Carpal tunnel syndrome: ICD-10: G56.00, Status: Active         Carcinoma of breast: ICD-10: C50.919, Status: Active     Family History: Family history of colon cancer  (Paternal Grandfather Age Unknown)         Social History:   Social Substance History:  ·  Smoking Status  never smoker (1)   ·  Tobacco Use denies   ·  Alcohol Use occasionally   ·  Drug Use denies   ·  Additional History     Social history: She works in a [employer].  Psychologically she is handling her disease well.  Patient had cancer twice and has undergone chemotherapy.  She had bilateral  mastectomy and reconstruction.  Patient is in good spirits.  She has chronic neuropathic pain.(1)           Performance:   ECOG Performance Status: 0 Fully Active          Vitals and Measurements:   Vitals: Temp: 36.2  HR: 101  RR: 16  BP: 106/67   SPO2%:  97   Measurements: HT(cm): 154.9  WT(kg): 71.4  BSA: 1.75   BMI:  29.7   Last 3 Weights & Heights: Date:                           Weight/Scale Type:                    Height:   14-Nov-2022 13:23                71.4  kg                     154.9  cm  16-May-2022 15:17                69.9  kg                     154.9  cm         Physical Exam:      Constitutional: Well developed, awake/alert/oriented  x3, no distress, alert and cooperative   Eyes: PERRL, EOMI, clear sclera.   ENMT: Face mask in place per COVID-19 protocols   Head/Neck: Neck supple, no apparent injury, thyroid  without mass or tenderness, No JVD, trachea midline, no bruits   Respiratory/Thorax: Patent airways, CTAB, normal  breath sounds with good chest expansion, thorax symmetric   Cardiovascular: Regular, rate and rhythm, no murmurs,  2+ equal pulses of the extremities, normal S 1and S 2      Musculoskeletal: ROM intact, no joint swelling, normal  strength      Extremities: normal extremities, no cyanosis edema,  contusions or wounds, no clubbing      Neurological: alert and oriented x3, intact senses,  motor, response and reflexes, normal strength      Breast: Bilateral mastectomy .   Lymphatic: No significant lymphadenopathy   Psychological: Appropriate mood and behavior   Skin: Warm and dry, no lesions, no rashes         Lab Results:       Ref Range & Units 5 mo ago  (8/2/23) 5 mo ago  (8/1/23) 1 yr  ago  (11/14/22) 1 yr ago  (5/16/22) 2 yr ago  (11/15/21) 2 yr ago  (8/16/21) 2 yr ago  (6/14/21)   WBC  4.4 - 11.3 x10E9/L 2.7 Low  3.4 Low  3.8 Low  4.9 3.3 Low  4.0 Low  9.0   RBC  4.00 - 5.20 x10E12/L 4.41 4.31 4.07 3.63 Low  4.27 4.25 4.08   Hemoglobin  12.0 - 16.0 g/dL 14.3 14.1 13.2 11.5 Low  12.7 12.6 12.6   Hematocrit  36.0 - 46.0 % 44.9 42.8 40.2 36.7 40.0 40.3 37.6   MCV  80 - 100 fL 102 High  99 99 101 High  94 95 92   MCHC  32.0 - 36.0 g/dL 31.8 Low  32.9 32.8 31.3 Low  31.8 Low  31.3 Low  33.5   Platelets  150 - 450 x10E9/L 165 141 Low  194 228 229 229 240     ·  Results               Assessment and Plan:      Assessment and Plan:   Assessment:    1. Carcinoma right breast- September 2015. Triple negative, post chemotherapy and mastectomy     2. Carcinoma left breast- July 2009. Triple negative, post chemotherapy and mastectomy     3. Multiple medical conditions including attention deficit, insomnia, neuropathic pain  #4 chronic leukopenia which is stable  Plan: We will continue observation of breast cancer, clinically no evidence of disease. She will return for follow-up visit with labs in 6 months. She will call office with any questions/concerns in the meantime. She will continue care with primary care  provider and other healthcare providers as directed.  I will also schedule her for screening colonoscopy        Total time =30 minutes. 50% or more of this time was spent in counseling and/or coordination of care including reviewing medical history/radiology/labs, examining patient, formulating outlined plan  with team, and discussing plan with patient/family.  I reviewed patient's chart including but not limited to labs, imaging, surgical/procedure notes, pathology, hospital notes, doctor's notes.

## 2024-01-24 ENCOUNTER — TELEPHONE (OUTPATIENT)
Dept: GASTROENTEROLOGY | Facility: CLINIC | Age: 54
End: 2024-01-24
Payer: COMMERCIAL

## 2024-01-24 ENCOUNTER — TELEPHONE (OUTPATIENT)
Dept: PRIMARY CARE | Facility: CLINIC | Age: 54
End: 2024-01-24
Payer: COMMERCIAL

## 2024-01-24 DIAGNOSIS — E03.9 ACQUIRED HYPOTHYROIDISM: ICD-10-CM

## 2024-01-24 RX ORDER — LEVOTHYROXINE SODIUM 75 UG/1
75 TABLET ORAL DAILY
Qty: 90 TABLET | Refills: 1 | Status: SHIPPED | OUTPATIENT
Start: 2024-01-24 | End: 2025-01-23

## 2024-01-24 NOTE — TELEPHONE ENCOUNTER
----- Message from Ysabel Franks MA sent at 1/24/2024  3:00 PM EST -----  Regarding: RE: Colonoscopy screening  [facility] COLONOSCOPY 3.4.24  ENDO  DR. KWASNICKA  [facility]  PACKET MAILED 1.24.24  ----- Message -----  From: Celena Mayer RN  Sent: 1/24/2024   2:10 PM EST  To: [id number]er[facility]  Subject: Colonoscopy screening                            Open access

## 2024-01-24 NOTE — TELEPHONE ENCOUNTER
Med Refill   levothyroxine (Synthroid, Levoxyl) 75 mcg tablet [[id number]]     [id number] - [place], [place] -  [address]  [address], [facility] [address]  Phone: [phone]  Fax: [phone]

## 2024-03-01 ENCOUNTER — PREP FOR PROCEDURE (OUTPATIENT)
Dept: GASTROENTEROLOGY | Facility: CLINIC | Age: 54
End: 2024-03-01
Payer: COMMERCIAL

## 2024-03-01 RX ORDER — SODIUM CHLORIDE 9 MG/ML
20 INJECTION, SOLUTION INTRAVENOUS CONTINUOUS
Status: CANCELLED | OUTPATIENT
Start: 2024-03-01

## 2024-03-04 ENCOUNTER — ANESTHESIA EVENT (OUTPATIENT)
Dept: GASTROENTEROLOGY | Facility: HOSPITAL | Age: 54
End: 2024-03-04
Payer: COMMERCIAL

## 2024-03-04 ENCOUNTER — HOSPITAL ENCOUNTER (OUTPATIENT)
Dept: GASTROENTEROLOGY | Facility: HOSPITAL | Age: 54
Discharge: HOME | End: 2024-03-04
Payer: COMMERCIAL

## 2024-03-04 ENCOUNTER — ANESTHESIA (OUTPATIENT)
Dept: GASTROENTEROLOGY | Facility: HOSPITAL | Age: 54
End: 2024-03-04
Payer: COMMERCIAL

## 2024-03-04 VITALS
BODY MASS INDEX: 28.32 KG/M2 | TEMPERATURE: 97.1 F | RESPIRATION RATE: 23 BRPM | HEART RATE: 68 BPM | OXYGEN SATURATION: 100 % | HEIGHT: 61 IN | SYSTOLIC BLOOD PRESSURE: 113 MMHG | WEIGHT: 150 LBS | DIASTOLIC BLOOD PRESSURE: 65 MMHG

## 2024-03-04 DIAGNOSIS — R19.5 POSITIVE COLORECTAL CANCER SCREENING USING COLOGUARD TEST: ICD-10-CM

## 2024-03-04 PROCEDURE — 2500000004 HC RX 250 GENERAL PHARMACY W/ HCPCS (ALT 636 FOR OP/ED): Performed by: NURSE ANESTHETIST, CERTIFIED REGISTERED

## 2024-03-04 PROCEDURE — 45380 COLONOSCOPY AND BIOPSY: CPT | Performed by: INTERNAL MEDICINE

## 2024-03-04 PROCEDURE — 2500000004 HC RX 250 GENERAL PHARMACY W/ HCPCS (ALT 636 FOR OP/ED): Performed by: INTERNAL MEDICINE

## 2024-03-04 PROCEDURE — 88305 TISSUE EXAM BY PATHOLOGIST: CPT | Performed by: STUDENT IN AN ORGANIZED HEALTH CARE EDUCATION/TRAINING PROGRAM

## 2024-03-04 PROCEDURE — 2500000005 HC RX 250 GENERAL PHARMACY W/O HCPCS: Performed by: NURSE ANESTHETIST, CERTIFIED REGISTERED

## 2024-03-04 PROCEDURE — 3700000001 HC GENERAL ANESTHESIA TIME - INITIAL BASE CHARGE

## 2024-03-04 PROCEDURE — 7100000009 HC PHASE TWO TIME - INITIAL BASE CHARGE

## 2024-03-04 PROCEDURE — 3700000002 HC GENERAL ANESTHESIA TIME - EACH INCREMENTAL 1 MINUTE

## 2024-03-04 PROCEDURE — 7100000010 HC PHASE TWO TIME - EACH INCREMENTAL 1 MINUTE

## 2024-03-04 PROCEDURE — 88305 TISSUE EXAM BY PATHOLOGIST: CPT | Mod: TC,PORLAB | Performed by: INTERNAL MEDICINE

## 2024-03-04 RX ORDER — SODIUM CHLORIDE 9 MG/ML
20 INJECTION, SOLUTION INTRAVENOUS CONTINUOUS
Status: DISCONTINUED | OUTPATIENT
Start: 2024-03-04 | End: 2024-03-05 | Stop reason: HOSPADM

## 2024-03-04 RX ORDER — LIDOCAINE HCL/PF 100 MG/5ML
SYRINGE (ML) INTRAVENOUS AS NEEDED
Status: DISCONTINUED | OUTPATIENT
Start: 2024-03-04 | End: 2024-03-04

## 2024-03-04 RX ORDER — PROPOFOL 10 MG/ML
INJECTION, EMULSION INTRAVENOUS AS NEEDED
Status: DISCONTINUED | OUTPATIENT
Start: 2024-03-04 | End: 2024-03-04

## 2024-03-04 RX ADMIN — LIDOCAINE HYDROCHLORIDE 100 MG: 20 INJECTION, SOLUTION INTRAVENOUS at 08:00

## 2024-03-04 RX ADMIN — PROPOFOL 400 MG: 10 INJECTION, EMULSION INTRAVENOUS at 08:00

## 2024-03-04 RX ADMIN — SODIUM CHLORIDE 20 ML/HR: 9 INJECTION, SOLUTION INTRAVENOUS at 07:30

## 2024-03-04 SDOH — HEALTH STABILITY: MENTAL HEALTH: CURRENT SMOKER: 0

## 2024-03-04 ASSESSMENT — PAIN SCALES - GENERAL
PAINLEVEL_OUTOF10: 0 - NO PAIN

## 2024-03-04 ASSESSMENT — PAIN - FUNCTIONAL ASSESSMENT
PAIN_FUNCTIONAL_ASSESSMENT: 0-10
PAIN_FUNCTIONAL_ASSESSMENT: 0-10

## 2024-03-04 ASSESSMENT — COLUMBIA-SUICIDE SEVERITY RATING SCALE - C-SSRS
6. HAVE YOU EVER DONE ANYTHING, STARTED TO DO ANYTHING, OR PREPARED TO DO ANYTHING TO END YOUR LIFE?: NO
1. IN THE PAST MONTH, HAVE YOU WISHED YOU WERE DEAD OR WISHED YOU COULD GO TO SLEEP AND NOT WAKE UP?: NO
2. HAVE YOU ACTUALLY HAD ANY THOUGHTS OF KILLING YOURSELF?: NO

## 2024-03-04 NOTE — LETTER
"March 11, 2024     America Tang  [address] [place] [address]      Dear Ms. Tang:    Below are the results from your recent visit:        The polyp (or polyps) that I removed during your recent colonoscopy were hyperplastic polyps. These polyps are benign (not cancerous) and they are not considered \"pre-cancerous\" either. They do not appear to increase the risk of colon cancer. I recommend that you have another colonoscopy in about 5 years.     If you have any other questions or concerns please do not hesitate to call me at my office at [phone].     Repeat Colonoscopy Interval: 5 years        Sincerely,        Elver Horn MD                      Resulted Orders   Surgical Pathology Exam   Result Value Ref Range    Case Report       Surgical Pathology                                Case: [id number]                                  Authorizing Provider:  Elver Horn MD        Collected:           03/04/2024 0813              Ordering Location:     [facility]    Received:            03/04/2024 1047                                     Building                                                                     Pathologist:           Aurelia Juarez MD                                                            Specimen:    RECTUM POLYPECTOMY, X 2                                                                    FINAL DIAGNOSIS       RECTUM, POLYPS (x2), POLYPECTOMIES:  - HYPERPLASTIC POLYPS.                By the signature on this report, the individual or group listed as making the Final Interpretation/Diagnosis certifies that they have reviewed this case.       Clinical History       Positive Cologuard test      Gross Description       Received in formalin, labeled with the patient's name and hospital number and \"rectum polyp\", are 2 fragments of tan, soft tissue aggregating to 0.5 x 0.2 x 0.2 cm. The specimen is submitted in toto in one cassette.  SBS            "

## 2024-03-04 NOTE — ANESTHESIA POSTPROCEDURE EVALUATION
Patient: America Tang    Procedure Summary       Date: 03/04/24 Room / Location: [facility]    Anesthesia Start: 0752 Anesthesia Stop: 0820    Procedure: COLONOSCOPY Diagnosis: Positive colorectal cancer screening using Cologuard test    Scheduled Providers: Elver Horn MD Responsible Provider: MART Hatfield    Anesthesia Type: MAC ASA Status: 3            Anesthesia Type: MAC    Vitals Value Taken Time   /70 03/04/24 0818   Temp 36.7 °C (98.1 °F) 03/04/24 0818   Pulse 93 03/04/24 0818   Resp 16 03/04/24 0818   SpO2 97 % 03/04/24 0818       Anesthesia Post Evaluation    Patient location during evaluation: bedside  Patient participation: complete - patient participated  Level of consciousness: awake  Pain management: adequate  Airway patency: patent  Cardiovascular status: acceptable  Respiratory status: acceptable  Hydration status: acceptable  Postoperative Nausea and Vomiting: none    No notable events documented.

## 2024-03-04 NOTE — ANESTHESIA PREPROCEDURE EVALUATION
Patient: America Tang    Procedure Information       Date/Time: 03/04/24 0800    Scheduled providers: Elver Horn MD    Procedure: COLONOSCOPY    Location: [facility]            Relevant Problems   Anesthesia (within normal limits)      Cardiovascular  Pt states svt r/t thyroid, no problems in a while   (+) SVT (supraventricular tachycardia)      Endocrine   (+) Hypothyroidism      Neuro/Psych   (+) Chemotherapy-induced neuropathy (CMS/HCC)       Clinical information reviewed:   Tobacco  Allergies  Meds   Med Hx  Surg Hx  OB Status  Fam Hx  Soc   Hx        NPO Detail:  NPO/Void Status  Date of Last Liquid: 03/04/24  Time of Last Liquid: 0000  Date of Last Solid: 03/02/24  Time of Last Solid: 1900         Physical Exam    Airway  Mallampati: II     Cardiovascular - normal exam     Dental    Pulmonary - normal exam     Abdominal          Anesthesia Plan    History of general anesthesia?: yes  History of complications of general anesthesia?: no    ASA 3     MAC     The patient is not a current smoker.    Anesthetic plan and risks discussed with patient.  Use of blood products discussed with who consented to blood products.

## 2024-03-04 NOTE — PRE-SEDATION DOCUMENTATION
Patient: America Tang  MRN: [id number]    Pre-sedation Evaluation:  Sedation necessary for: Analgesia  Requesting service: GI    History of Present Illness:     America Tang is a 53 y.o. female with a history of breast cancer, hypothyroidism, ADD, chronic pain, and HLD who presents for OPEN ACCESS colonoscopy requested by her PCP for the evaluation of a positive Cologuard.    She has never had a colonoscopy before. Her maternal grandfather had colon cancer and her mother has had polyps.         Past Medical History:   Diagnosis Date    Anxiety disorder, unspecified     Anxiety    Personal history of malignant neoplasm of breast     History of malignant neoplasm of breast    Personal history of other diseases of the nervous system and sense organs     History of migraine    Personal history of other mental and behavioral disorders     History of depression    Personal history of other mental and behavioral disorders     History of attention deficit hyperactivity disorder (ADHD)    Urinary tract infection, site not specified     Urinary tract bacterial infections       Principle problems:  Patient Active Problem List    Diagnosis Date Noted    SVT (supraventricular tachycardia) 09/06/2023    Hx of breast cancer 09/06/2023    Wellness examination 03/06/2023    Dyslipidemia 02/07/2023    Episode of visual disturbance 02/07/2023    Hypothyroidism 02/07/2023    Joint pain 02/07/2023    Myofascial pain syndrome 02/07/2023    Chronic pain due to neoplasm 02/07/2023    Other chronic postprocedural pain 02/07/2023    Paresthesia 02/07/2023    UTI symptoms 02/07/2023    Vaginal discharge 02/07/2023    Weakness of both hips 02/07/2023    Abnormal CAT scan 01/18/2023    Abnormal fasting glucose 01/18/2023    ADD (attention deficit disorder) 01/18/2023    Bilateral back pain 01/18/2023    Chemotherapy-induced neuropathy (CMS/HCC) 01/18/2023    Personal history of breast cancer 03/25/2016    S/P lumpectomy, left breast  03/25/2016     Allergies:  Allergies   Allergen Reactions    Sulfa (Sulfonamide Antibiotics) Unknown    Sulfamethoxazole Unknown     Sulfa drugs     PTA/Current Medications:  (Not in a hospital admission)    Current Outpatient Medications   Medication Sig Dispense Refill    levothyroxine (Synthroid, Levoxyl) 75 mcg tablet TAKE 1 TABLET BY MOUTH ONCE DAILY 90 tablet 1    multivitamin tablet Take by mouth.      naproxen (Naprosyn) 500 mg tablet TAKE 1 TABLET BY MOUTH TWO TIMES A DAY (Patient not taking: Reported on 3/4/2024) 17 tablet 0     Current Facility-Administered Medications   Medication Dose Route Frequency Provider Last Rate Last Admin    sodium chloride 0.9% infusion  20 mL/hr intravenous Continuous Elver Horn MD 20 mL/hr at 03/04/24 0730 20 mL/hr at 03/04/24 0730     Past Surgical History:   has a past surgical history that includes Hysterectomy (01/15/2016) and Breast biopsy (01/15/2016).    Recent sedation/surgery (24 hours) No    Review of Systems:  Please check all that apply: No significant medical history    Pregnancy test completed prior to procedure on any menstruating female: none        NPO guidelines met: Yes    Physical Exam    Airway  Mallampati: I  Neck ROM: full  Comments: Normal mouth opening.   Cardiovascular   Rhythm: regular  Rate: normal  (-) murmur     Dental    Pulmonary   Breath sounds clear to auscultation  (-) wheezes       Other findings: Abdomen is soft, nontender, and not distended.    Patient is calm appearing and in no apparent distress.    Patient is awake and alert and oriented x4.      Plan    ASA 2     Moderate   (Sedation medications to be delivered via monitored anesthesia care (MAC).    This evaluation serves as my H&P.    Outpatient medication list and allergies have been reviewed.  Pre-procedure/osiris procedure antibiotics not needed.    Pre-procedure evaluation completed by physician.    Surgeon has reviewed key risks related to the risk of deon COVID-19  and if they contract COVID-19 what the risks are.)

## 2024-03-05 NOTE — ADDENDUM NOTE
Encounter addended by: Marry Jarquin RN on: 3/5/2024 10:01 AM   Actions taken: Contacts section saved, Flowsheet accepted

## 2024-03-10 LAB
LABORATORY COMMENT REPORT: NORMAL
PATH REPORT.FINAL DX SPEC: NORMAL
PATH REPORT.GROSS SPEC: NORMAL
PATH REPORT.RELEVANT HX SPEC: NORMAL
PATH REPORT.TOTAL CANCER: NORMAL

## 2024-06-06 ENCOUNTER — APPOINTMENT (OUTPATIENT)
Dept: PRIMARY CARE | Facility: CLINIC | Age: 54
End: 2024-06-06
Payer: COMMERCIAL

## 2024-06-20 DIAGNOSIS — E03.9 ACQUIRED HYPOTHYROIDISM: ICD-10-CM

## 2024-06-20 RX ORDER — LEVOTHYROXINE SODIUM 75 UG/1
75 TABLET ORAL DAILY
Qty: 90 TABLET | Refills: 3 | Status: SHIPPED | OUTPATIENT
Start: 2024-06-20 | End: 2025-06-20

## 2024-06-20 NOTE — TELEPHONE ENCOUNTER
Med Refill   levothyroxine (Synthroid, Levoxyl) 75 mcg tablet [[id number]]     [id number] - [place], [place] - [address]  [address], [place] [address]  Phone: [phone]  Fax: [phone]  LUIS #: [id number]     LOV: 12/06/23     NOV: N/A

## 2024-07-08 ENCOUNTER — APPOINTMENT (OUTPATIENT)
Dept: HEMATOLOGY/ONCOLOGY | Facility: CLINIC | Age: 54
End: 2024-07-08
Payer: COMMERCIAL

## 2024-10-17 ENCOUNTER — APPOINTMENT (OUTPATIENT)
Dept: HEMATOLOGY/ONCOLOGY | Facility: CLINIC | Age: 54
End: 2024-10-17
Payer: COMMERCIAL

## 2024-11-05 ENCOUNTER — OFFICE VISIT (OUTPATIENT)
Dept: HEMATOLOGY/ONCOLOGY | Facility: CLINIC | Age: 54
End: 2024-11-05
Payer: COMMERCIAL

## 2024-11-05 VITALS
TEMPERATURE: 98.1 F | HEIGHT: 61 IN | SYSTOLIC BLOOD PRESSURE: 136 MMHG | WEIGHT: 150.57 LBS | OXYGEN SATURATION: 97 % | HEART RATE: 98 BPM | RESPIRATION RATE: 16 BRPM | DIASTOLIC BLOOD PRESSURE: 81 MMHG | BODY MASS INDEX: 28.43 KG/M2

## 2024-11-05 DIAGNOSIS — Z85.3 HX OF BREAST CANCER: ICD-10-CM

## 2024-11-05 LAB
ALBUMIN SERPL BCP-MCNC: 4.7 G/DL (ref 3.4–5)
ALP SERPL-CCNC: 57 U/L (ref 33–110)
ALT SERPL W P-5'-P-CCNC: 46 U/L (ref 7–45)
ANION GAP SERPL CALC-SCNC: 17 MMOL/L (ref 10–20)
AST SERPL W P-5'-P-CCNC: 61 U/L (ref 9–39)
BASOPHILS # BLD AUTO: 0.04 X10*3/UL (ref 0–0.1)
BASOPHILS NFR BLD AUTO: 0.9 %
BILIRUB SERPL-MCNC: 0.7 MG/DL (ref 0–1.2)
BUN SERPL-MCNC: 12 MG/DL (ref 6–23)
CALCIUM SERPL-MCNC: 9.7 MG/DL (ref 8.6–10.3)
CANCER AG27-29 SERPL-ACNC: <4.1 U/ML (ref 0–38.6)
CHLORIDE SERPL-SCNC: 99 MMOL/L (ref 98–107)
CO2 SERPL-SCNC: 27 MMOL/L (ref 21–32)
CREAT SERPL-MCNC: 0.67 MG/DL (ref 0.5–1.05)
EGFRCR SERPLBLD CKD-EPI 2021: >90 ML/MIN/1.73M*2
EOSINOPHIL # BLD AUTO: 0.05 X10*3/UL (ref 0–0.7)
EOSINOPHIL NFR BLD AUTO: 1.1 %
ERYTHROCYTE [DISTWIDTH] IN BLOOD BY AUTOMATED COUNT: 13 % (ref 11.5–14.5)
GLUCOSE SERPL-MCNC: 95 MG/DL (ref 74–99)
HCT VFR BLD AUTO: 43 % (ref 36–46)
HGB BLD-MCNC: 14.1 G/DL (ref 12–16)
IMM GRANULOCYTES # BLD AUTO: 0.01 X10*3/UL (ref 0–0.7)
IMM GRANULOCYTES NFR BLD AUTO: 0.2 % (ref 0–0.9)
LYMPHOCYTES # BLD AUTO: 1.04 X10*3/UL (ref 1.2–4.8)
LYMPHOCYTES NFR BLD AUTO: 23.9 %
MCH RBC QN AUTO: 32.1 PG (ref 26–34)
MCHC RBC AUTO-ENTMCNC: 32.8 G/DL (ref 32–36)
MCV RBC AUTO: 98 FL (ref 80–100)
MONOCYTES # BLD AUTO: 0.56 X10*3/UL (ref 0.1–1)
MONOCYTES NFR BLD AUTO: 12.8 %
NEUTROPHILS # BLD AUTO: 2.66 X10*3/UL (ref 1.2–7.7)
NEUTROPHILS NFR BLD AUTO: 61.1 %
PLATELET # BLD AUTO: 235 X10*3/UL (ref 150–450)
POTASSIUM SERPL-SCNC: 4 MMOL/L (ref 3.5–5.3)
PROT SERPL-MCNC: 7.5 G/DL (ref 6.4–8.2)
RBC # BLD AUTO: 4.39 X10*6/UL (ref 4–5.2)
SODIUM SERPL-SCNC: 139 MMOL/L (ref 136–145)
WBC # BLD AUTO: 4.4 X10*3/UL (ref 4.4–11.3)

## 2024-11-05 PROCEDURE — 99214 OFFICE O/P EST MOD 30 MIN: CPT | Performed by: INTERNAL MEDICINE

## 2024-11-05 PROCEDURE — 3008F BODY MASS INDEX DOCD: CPT | Performed by: INTERNAL MEDICINE

## 2024-11-05 PROCEDURE — 36415 COLL VENOUS BLD VENIPUNCTURE: CPT | Performed by: INTERNAL MEDICINE

## 2024-11-05 PROCEDURE — 85025 COMPLETE CBC W/AUTO DIFF WBC: CPT | Performed by: INTERNAL MEDICINE

## 2024-11-05 PROCEDURE — 80053 COMPREHEN METABOLIC PANEL: CPT | Performed by: INTERNAL MEDICINE

## 2024-11-05 PROCEDURE — 86300 IMMUNOASSAY TUMOR CA 15-3: CPT | Mod: PORLAB | Performed by: INTERNAL MEDICINE

## 2024-11-05 ASSESSMENT — PAIN SCALES - GENERAL: PAINLEVEL_OUTOF10: 0-NO PAIN

## 2024-11-05 NOTE — PROGRESS NOTES
Patient Visit Information:   Visit Type: Follow Up Visit      Cancer History:          Breast         AJCC Edition: 7th (AJCC), Diagnosis Date: 09-Sep-2015, IIIA, T3 pN2 M0      Treatment Synopsis:    1. Right breast cancer  Palpable mass in right breast on exam September 17, 2015  Lumpectomy with sentinal lymph nodes, Stage IIIA T3, N2, M0, triple negative  MUGA scan showed low ejection fraction of 47%, 2D echocardiogram with EF 55%  Adjuvant chemotherapy with Adriamycin, Cytoxan x4 followed by Taxotere/carboplatin x4     2. Left breast cancer  Diagnosed July 27, 2009, Stage I, T1b, N0, M0  Triple negative  Partial mastectomy  Adjuvant chemotherapy with Adriamycin, Cytoxan and Taxane     BRCA positive  Underwent hysterectomy and bilateral oophorectomy  Bilateral mastectomy   3/04/24 , screening colonoscopy, rectal polyp subcentimeter, status post polypectomy  Hyperplastic polyps  History of Present Illness:      ID Statement:    AMERICA TANG is a 51 year old Female        Chief Complaint: Office visit for follow-up of breast  cancer.   Interval History:    America Tang is here today for interval follow-up and survivorship visit for breast cancer. She reports she is overall doing well. She has chronic arthralgias and  nerve pain related to bilateral mastectomy - has been following with pain management with good control. She denies any shortness of breath, chest pain, abdominal pain, constipation. No skin changes, itching, rashes. Denies bleeding indications, fevers,  chills, night sweats, unexplained weight loss or any other significant concerns. Reports she completes her own chest wall exams and denies any areas of changes or concern.   Screening colonoscopy done in March 2024 rectal polyps were found, status post polypectomy, hyperplastic polyps     Review of Systems:   Review of Systems:    Review of systems:   Constitutional: Feeling well, no weakness, fatigue.  Head and neck: occasional headaches.     Cardiac: no chest pain or palpitations.   Pulmonary: no cough or shortness of breath.    GI: appetite is fair weight is stable. Denies abdominal pain, n/v, c/d.   : no frequency urgency.    GYN: she is post oophorectomy.    Neuromuscular: tingling and numbness in the fingers and toes.   Musculocutaneous: she has pains in her joints and muscles. chronic and stable.   Endocrine: no heat or cold intolerance.    Skin: no rash or itching        Allergies and Intolerances:       Allergies:         Bactrim: Drug, Rash, Itching, Active         sulfamethoxazole: Drug, Unknown, Active         sulfa drugs: Drug Category, Unknown, Active     Outpatient Medication Profile:  * Patient Currently Takes Medications as of 16-May-2022 16:08 documented in Structured Notes         lactulose 10 g/15 mL oral and rectal liquid : 30 milliliter(s) orally 1 to 2 times a day, As Needed , Start Date: 17-May-2022         traMADol 100 mg oral tablet: 1 tab(s) orally every 6 hours as needed  x 30 days , Start Date: 25-Jan-2022         levothyroxine 75 mcg (0.075 mg) oral capsule: 1 cap(s) orally once a day             Medical History:         Chronic pain of multiple joints: ICD-10: M25.50, Status:  Active         Chronic joint pain: ICD-10: M25.50, Status: Active         Neuropathic pain: ICD-10: M79.2, Status: Active         Arthritis: ICD-10: M19.90, Status: Active         Insomnia: ICD-10: G47.00, Status: Active         Neutropenia: ICD-10: D70.9, Status: Active         Drug-induced neutropenia: ICD-10: D70.2, Status: Active         Attention deficit: ICD-10: R41.840, Status: Active         Breast cancer: ICD-10: C50.919, Status: Active       Surg History:         History of bilateral mastectomy: ICD-10: Z90.13, Status:  Active         Carpal tunnel syndrome: ICD-10: G56.00, Status: Active         Carcinoma of breast: ICD-10: C50.919, Status: Active     Family History: Family history of colon cancer  (Paternal Grandfather Age Unknown)          Social History:   Social Substance History:  ·  Smoking Status never smoker (1)   ·  Tobacco Use denies   ·  Alcohol Use occasionally   ·  Drug Use denies   ·  Additional History     Social history: She works in a [employer].  Psychologically she is handling her disease well.  Patient had cancer twice and has undergone chemotherapy.  She had bilateral  mastectomy and reconstruction.  Patient is in good spirits.  She has chronic neuropathic pain.(1)           Performance:   ECOG Performance Status: 0 Fully Active          Vitals and Measurements:   Vitals: Temp: 36.2  HR: 101  RR: 16  BP: 106/67   SPO2%:  97   Measurements: HT(cm): 154.9  WT(kg): 71.4  BSA: 1.75   BMI:  29.7   Last 3 Weights & Heights: Date:                           Weight/Scale Type:                    Height:   14-Nov-2022 13:23                71.4  kg                     154.9  cm  16-May-2022 15:17                69.9  kg                     154.9  cm         Physical Exam:      Constitutional: Well developed, awake/alert/oriented  x3, no distress, alert and cooperative   Eyes: PERRL, EOMI, clear sclera.   ENMT: Face mask in place per COVID-19 protocols   Head/Neck: Neck supple, no apparent injury, thyroid  without mass or tenderness, No JVD, trachea midline, no bruits   Respiratory/Thorax: Patent airways, CTAB, normal  breath sounds with good chest expansion, thorax symmetric   Cardiovascular: Regular, rate and rhythm, no murmurs,  2+ equal pulses of the extremities, normal S 1and S 2      Musculoskeletal: ROM intact, no joint swelling, normal  strength      Extremities: normal extremities, no cyanosis edema,  contusions or wounds, no clubbing      Neurological: alert and oriented x3, intact senses,  motor, response and reflexes, normal strength      Breast: Bilateral mastectomy .   Lymphatic: No significant lymphadenopathy   Psychological: Appropriate mood and behavior   Skin: Warm and dry, no lesions, no rashes         Lab Results:       11/5/24) 10 mo ago  (1/8/24) 1 yr ago  (8/28/23) 1 yr ago  (8/2/23) 1 yr ago  (8/1/23) 1 yr ago  (11/14/22) 2 yr ago  (5/16/22)    WBC  4.4 - 11.3 x10*3/uL 4.4 3.2 Low  4.0 Low  R 2.7 Low  R 3.4 Low  R 3.8 Low  R 4.9 R   RBC  4.00 - 5.20 x10*6/uL 4.39 3.89 Low  4.05 R 4.41 R 4.31 R 4.07 R 3.63 Low  R   Hemoglobin  12.0 - 16.0 g/dL 14.1 12.1 13.1 14.3 14.1 13.2 11.5 Low    Hematocrit  36.0 - 46.0 % 43.0 38.1 41.3 44.9 42.8 40.2 36.7   MCV  80 - 100 fL 98 98 102 High  102 High  99 99 101 High    MCH  26.0 - 34.0 pg 32.1 31.1        MCHC  32.0 - 36.0 g/dL 32.8 31.8 Low  31.7 Low  31.8 Low  32.9 32.8 31.3 Low    RDW  11.5 - 14.5 % 13.0 13.0 13.2 13.9 13.8 13.5 14.4   Platelets  150 - 450 x10*3/uL 235             ·  Results               Assessment and Plan:      Assessment and Plan:   Assessment:    1. Carcinoma right breast- September 2015. Triple negative, post chemotherapy and mastectomy     2. Carcinoma left breast- July 2009. Triple negative, post chemotherapy and mastectomy     3. Multiple medical conditions including attention deficit, insomnia, neuropathic pain  #4 chronic leukopenia which is stable  Plan: We will continue observation of breast cancer, clinically no evidence of disease. She will return for follow-up visit with labs in 6 months. She will call office with any questions/concerns in the meantime. She will continue care with primary care  provider and other healthcare providers as directed.          Total time =30 minutes. 50% or more of this time was spent in counseling and/or coordination of care including reviewing medical history/radiology/labs, examining patient, formulating outlined plan  with team, and discussing plan with patient/family.  I reviewed patient's chart including but not limited to labs, imaging, surgical/procedure notes, pathology, hospital notes, doctor's notes.

## 2024-11-05 NOTE — PATIENT INSTRUCTIONS
Follow up visit for history of right triple negative breast cancer diagnosed in 2015 and left triple negative breast cancer diagnosed in 2009.     Return in 6 months for routine follow up with Dr. Pantoja.

## 2024-11-22 PROBLEM — Z86.69 HISTORY OF MIGRAINE: Status: ACTIVE | Noted: 2024-11-22

## 2024-11-22 PROBLEM — M51.369 DEGENERATION OF INTERVERTEBRAL DISC OF LUMBAR REGION: Status: ACTIVE | Noted: 2023-05-11

## 2024-11-22 PROBLEM — M25.539 PAIN IN WRIST: Status: ACTIVE | Noted: 2024-11-22

## 2024-11-22 PROBLEM — S63.509A SPRAIN OF WRIST: Status: ACTIVE | Noted: 2023-08-15

## 2024-11-22 PROBLEM — Z86.59 HISTORY OF DEPRESSION: Status: ACTIVE | Noted: 2024-11-22

## 2024-11-22 PROBLEM — N39.0 URINARY TRACT BACTERIAL INFECTIONS: Status: ACTIVE | Noted: 2024-11-22

## 2024-11-22 PROBLEM — Z86.79 HISTORY OF PAROXYSMAL SUPRAVENTRICULAR TACHYCARDIA: Status: ACTIVE | Noted: 2024-11-22

## 2024-11-22 PROBLEM — A49.9 URINARY TRACT BACTERIAL INFECTIONS: Status: ACTIVE | Noted: 2024-11-22

## 2024-11-22 PROBLEM — G62.9 PERIPHERAL NERVE DISEASE: Status: ACTIVE | Noted: 2024-11-22

## 2024-11-22 PROBLEM — R61 GENERALIZED HYPERHIDROSIS: Status: ACTIVE | Noted: 2023-08-02

## 2024-11-25 ENCOUNTER — LAB (OUTPATIENT)
Dept: LAB | Facility: LAB | Age: 54
End: 2024-11-25
Payer: COMMERCIAL

## 2024-11-25 ENCOUNTER — APPOINTMENT (OUTPATIENT)
Dept: PRIMARY CARE | Facility: CLINIC | Age: 54
End: 2024-11-25
Payer: COMMERCIAL

## 2024-11-25 VITALS
HEIGHT: 61 IN | SYSTOLIC BLOOD PRESSURE: 110 MMHG | OXYGEN SATURATION: 99 % | BODY MASS INDEX: 28.7 KG/M2 | WEIGHT: 152 LBS | HEART RATE: 95 BPM | DIASTOLIC BLOOD PRESSURE: 80 MMHG

## 2024-11-25 DIAGNOSIS — Z85.3 HX OF BREAST CANCER: ICD-10-CM

## 2024-11-25 DIAGNOSIS — E03.9 HYPOTHYROIDISM, UNSPECIFIED TYPE: Primary | Chronic | ICD-10-CM

## 2024-11-25 DIAGNOSIS — M19.041 LOCALIZED OSTEOARTHRITIS OF HAND, RIGHT: ICD-10-CM

## 2024-11-25 PROCEDURE — 3008F BODY MASS INDEX DOCD: CPT | Performed by: STUDENT IN AN ORGANIZED HEALTH CARE EDUCATION/TRAINING PROGRAM

## 2024-11-25 PROCEDURE — 36415 COLL VENOUS BLD VENIPUNCTURE: CPT

## 2024-11-25 PROCEDURE — 1036F TOBACCO NON-USER: CPT | Performed by: STUDENT IN AN ORGANIZED HEALTH CARE EDUCATION/TRAINING PROGRAM

## 2024-11-25 PROCEDURE — 86300 IMMUNOASSAY TUMOR CA 15-3: CPT

## 2024-11-25 PROCEDURE — 99213 OFFICE O/P EST LOW 20 MIN: CPT | Performed by: STUDENT IN AN ORGANIZED HEALTH CARE EDUCATION/TRAINING PROGRAM

## 2024-11-25 RX ORDER — IBUPROFEN 800 MG/1
800 TABLET ORAL 3 TIMES DAILY PRN
Qty: 30 TABLET | Refills: 3 | Status: SHIPPED | OUTPATIENT
Start: 2024-11-25 | End: 2025-11-25

## 2024-11-25 ASSESSMENT — ENCOUNTER SYMPTOMS
OCCASIONAL FEELINGS OF UNSTEADINESS: 0
WHEEZING: 0
PALPITATIONS: 0
SHORTNESS OF BREATH: 0
LOSS OF SENSATION IN FEET: 0
ARTHRALGIAS: 1
FATIGUE: 0
FEVER: 0
CONFUSION: 0
DEPRESSION: 0

## 2024-11-25 ASSESSMENT — COLUMBIA-SUICIDE SEVERITY RATING SCALE - C-SSRS
1. IN THE PAST MONTH, HAVE YOU WISHED YOU WERE DEAD OR WISHED YOU COULD GO TO SLEEP AND NOT WAKE UP?: NO
6. HAVE YOU EVER DONE ANYTHING, STARTED TO DO ANYTHING, OR PREPARED TO DO ANYTHING TO END YOUR LIFE?: NO
2. HAVE YOU ACTUALLY HAD ANY THOUGHTS OF KILLING YOURSELF?: NO

## 2024-11-26 LAB — CANCER AG27-29 SERPL-ACNC: 11.8 U/ML (ref 0–38.6)

## 2025-05-05 ENCOUNTER — APPOINTMENT (OUTPATIENT)
Dept: HEMATOLOGY/ONCOLOGY | Facility: CLINIC | Age: 55
End: 2025-05-05
Payer: COMMERCIAL

## 2025-05-05 ENCOUNTER — OFFICE VISIT (OUTPATIENT)
Dept: HEMATOLOGY/ONCOLOGY | Facility: CLINIC | Age: 55
End: 2025-05-05
Payer: COMMERCIAL

## 2025-05-05 VITALS
OXYGEN SATURATION: 97 % | DIASTOLIC BLOOD PRESSURE: 81 MMHG | WEIGHT: 149.47 LBS | HEART RATE: 95 BPM | RESPIRATION RATE: 16 BRPM | BODY MASS INDEX: 28.24 KG/M2 | SYSTOLIC BLOOD PRESSURE: 122 MMHG | TEMPERATURE: 97 F

## 2025-05-05 DIAGNOSIS — Z85.3 HX OF BREAST CANCER: ICD-10-CM

## 2025-05-05 LAB
ALBUMIN SERPL BCP-MCNC: 4.7 G/DL (ref 3.4–5)
ALP SERPL-CCNC: 40 U/L (ref 33–110)
ALT SERPL W P-5'-P-CCNC: 58 U/L (ref 7–45)
ANION GAP SERPL CALC-SCNC: 13 MMOL/L (ref 10–20)
AST SERPL W P-5'-P-CCNC: 75 U/L (ref 9–39)
BASOPHILS # BLD AUTO: 0.02 X10*3/UL (ref 0–0.1)
BASOPHILS NFR BLD AUTO: 0.4 %
BILIRUB SERPL-MCNC: 0.9 MG/DL (ref 0–1.2)
BUN SERPL-MCNC: 19 MG/DL (ref 6–23)
CALCIUM SERPL-MCNC: 10.2 MG/DL (ref 8.6–10.3)
CANCER AG27-29 SERPL-ACNC: 12.9 U/ML (ref 0–38.6)
CHLORIDE SERPL-SCNC: 97 MMOL/L (ref 98–107)
CO2 SERPL-SCNC: 29 MMOL/L (ref 21–32)
CREAT SERPL-MCNC: 0.93 MG/DL (ref 0.5–1.05)
EGFRCR SERPLBLD CKD-EPI 2021: 74 ML/MIN/1.73M*2
EOSINOPHIL # BLD AUTO: 0.05 X10*3/UL (ref 0–0.7)
EOSINOPHIL NFR BLD AUTO: 0.9 %
ERYTHROCYTE [DISTWIDTH] IN BLOOD BY AUTOMATED COUNT: 12.6 % (ref 11.5–14.5)
GLUCOSE SERPL-MCNC: 109 MG/DL (ref 74–99)
HCT VFR BLD AUTO: 42.3 % (ref 36–46)
HGB BLD-MCNC: 13.7 G/DL (ref 12–16)
IMM GRANULOCYTES # BLD AUTO: 0.01 X10*3/UL (ref 0–0.7)
IMM GRANULOCYTES NFR BLD AUTO: 0.2 % (ref 0–0.9)
LYMPHOCYTES # BLD AUTO: 0.87 X10*3/UL (ref 1.2–4.8)
LYMPHOCYTES NFR BLD AUTO: 16.3 %
MCH RBC QN AUTO: 32.2 PG (ref 26–34)
MCHC RBC AUTO-ENTMCNC: 32.4 G/DL (ref 32–36)
MCV RBC AUTO: 100 FL (ref 80–100)
MONOCYTES # BLD AUTO: 0.47 X10*3/UL (ref 0.1–1)
MONOCYTES NFR BLD AUTO: 8.8 %
NEUTROPHILS # BLD AUTO: 3.93 X10*3/UL (ref 1.2–7.7)
NEUTROPHILS NFR BLD AUTO: 73.4 %
PLATELET # BLD AUTO: 148 X10*3/UL (ref 150–450)
POTASSIUM SERPL-SCNC: 3.1 MMOL/L (ref 3.5–5.3)
PROT SERPL-MCNC: 7.4 G/DL (ref 6.4–8.2)
RBC # BLD AUTO: 4.25 X10*6/UL (ref 4–5.2)
SODIUM SERPL-SCNC: 136 MMOL/L (ref 136–145)
WBC # BLD AUTO: 5.4 X10*3/UL (ref 4.4–11.3)

## 2025-05-05 PROCEDURE — 36415 COLL VENOUS BLD VENIPUNCTURE: CPT | Performed by: INTERNAL MEDICINE

## 2025-05-05 PROCEDURE — 85025 COMPLETE CBC W/AUTO DIFF WBC: CPT | Performed by: INTERNAL MEDICINE

## 2025-05-05 PROCEDURE — 86300 IMMUNOASSAY TUMOR CA 15-3: CPT | Mod: PORLAB | Performed by: INTERNAL MEDICINE

## 2025-05-05 PROCEDURE — 99214 OFFICE O/P EST MOD 30 MIN: CPT | Performed by: INTERNAL MEDICINE

## 2025-05-05 PROCEDURE — 80053 COMPREHEN METABOLIC PANEL: CPT | Performed by: INTERNAL MEDICINE

## 2025-05-05 ASSESSMENT — PAIN SCALES - GENERAL: PAINLEVEL_OUTOF10: 0-NO PAIN

## 2025-05-05 NOTE — PROGRESS NOTES
Patient Visit Information:   Visit Type: Follow Up Visit      Cancer History:          Breast         AJCC Edition: 7th (AJCC), Diagnosis Date: 09-Sep-2015, IIIA, T3 pN2 M0      Treatment Synopsis:    1. Right breast cancer  Palpable mass in right breast on exam September 17, 2015  Lumpectomy with sentinal lymph nodes, Stage IIIA T3, N2, M0, triple negative  MUGA scan showed low ejection fraction of 47%, 2D echocardiogram with EF 55%  Adjuvant chemotherapy with Adriamycin, Cytoxan x4 followed by Taxotere/carboplatin x4     2. Left breast cancer  Diagnosed July 27, 2009, Stage I, T1b, N0, M0  Triple negative  Partial mastectomy  Adjuvant chemotherapy with Adriamycin, Cytoxan and Taxane     BRCA positive  Underwent hysterectomy and bilateral oophorectomy  Bilateral mastectomy   3/04/24 , screening colonoscopy, rectal polyp subcentimeter, status post polypectomy  Hyperplastic polyps  History of Present Illness:      ID Statement:    AMERICA TANG is a 51 year old Female        Chief Complaint: Office visit for follow-up of breast  cancer.   Interval History:    5/5/25  America Tang is here today for interval follow-up and survivorship visit for breast cancer. She reports she is overall doing well. She has chronic arthralgias and  nerve pain related to bilateral mastectomy - has been following with pain management with good control. She denies any shortness of breath, chest pain, abdominal pain, constipation. No skin changes, itching, rashes. Denies bleeding indications, fevers,  chills, night sweats, unexplained weight loss or any other significant concerns. Reports she completes her own chest wall exams and denies any areas of changes or concern.     Screening colonoscopy done in March 2024 rectal polyps were found, status post polypectomy, hyperplastic polyps     Review of Systems:   Review of Systems:    Review of systems:   Constitutional: Feeling well, no weakness, fatigue.  Head and neck: occasional  headaches.    Cardiac: no chest pain or palpitations.   Pulmonary: no cough or shortness of breath.    GI: appetite is fair weight is stable. Denies abdominal pain, n/v, c/d.   : no frequency urgency.    GYN: she is post oophorectomy.    Neuromuscular: tingling and numbness in the fingers and toes.   Musculocutaneous: she has pains in her joints and muscles. chronic and stable.   Endocrine: no heat or cold intolerance.    Skin: no rash or itching        Allergies and Intolerances:       Allergies:         Bactrim: Drug, Rash, Itching, Active         sulfamethoxazole: Drug, Unknown, Active         sulfa drugs: Drug Category, Unknown, Active     Outpatient Medication Profile:  * Patient Currently Takes Medications as of 16-May-2022 16:08 documented in Structured Notes         lactulose 10 g/15 mL oral and rectal liquid : 30 milliliter(s) orally 1 to 2 times a day, As Needed , Start Date: 17-May-2022         traMADol 100 mg oral tablet: 1 tab(s) orally every 6 hours as needed  x 30 days , Start Date: 25-Jan-2022         levothyroxine 75 mcg (0.075 mg) oral capsule: 1 cap(s) orally once a day             Medical History:         Chronic pain of multiple joints: ICD-10: M25.50, Status:  Active         Chronic joint pain: ICD-10: M25.50, Status: Active         Neuropathic pain: ICD-10: M79.2, Status: Active         Arthritis: ICD-10: M19.90, Status: Active         Insomnia: ICD-10: G47.00, Status: Active         Neutropenia: ICD-10: D70.9, Status: Active         Drug-induced neutropenia: ICD-10: D70.2, Status: Active         Attention deficit: ICD-10: R41.840, Status: Active         Breast cancer: ICD-10: C50.919, Status: Active       Surg History:         History of bilateral mastectomy: ICD-10: Z90.13, Status:  Active         Carpal tunnel syndrome: ICD-10: G56.00, Status: Active         Carcinoma of breast: ICD-10: C50.919, Status: Active     Family History: Family history of colon cancer  (Paternal Grandfather Age  Unknown)         Social History:   Social Substance History:  ·  Smoking Status never smoker (1)   ·  Tobacco Use denies   ·  Alcohol Use occasionally   ·  Drug Use denies   ·  Additional History     Social history: She works in a [employer].  Psychologically she is handling her disease well.  Patient had cancer twice and has undergone chemotherapy.  She had bilateral  mastectomy and reconstruction.  Patient is in good spirits.  She has chronic neuropathic pain.(1)           Performance:   ECOG Performance Status: 0 Fully Active          Vitals and Measurements:   Vitals: Temp: 36.2  HR: 101  RR: 16  BP: 106/67   SPO2%:  97   Measurements: HT(cm): 154.9  WT(kg): 71.4  BSA: 1.75   BMI:  29.7   Last 3 Weights & Heights: Date:                           Weight/Scale Type:                    Height:   14-Nov-2022 13:23                71.4  kg                     154.9  cm  16-May-2022 15:17                69.9  kg                     154.9  cm         Physical Exam:      Constitutional: Well developed, awake/alert/oriented  x3, no distress, alert and cooperative   Eyes: PERRL, EOMI, clear sclera.   ENMT: Face mask in place per COVID-19 protocols   Head/Neck: Neck supple, no apparent injury, thyroid  without mass or tenderness, No JVD, trachea midline, no bruits   Respiratory/Thorax: Patent airways, CTAB, normal  breath sounds with good chest expansion, thorax symmetric   Cardiovascular: Regular, rate and rhythm, no murmurs,  2+ equal pulses of the extremities, normal S 1and S 2      Musculoskeletal: ROM intact, no joint swelling, normal  strength      Extremities: normal extremities, no cyanosis edema,  contusions or wounds, no clubbing      Neurological: alert and oriented x3, intact senses,  motor, response and reflexes, normal strength      Breast: Bilateral mastectomy .   Lymphatic: No significant lymphadenopathy   Psychological: Appropriate mood and behavior   Skin: Warm and dry, no lesions, no rashes         Lab  Results:        5/5/25) 6 mo ago  (11/5/24) 1 yr ago  (1/8/24) 1 yr ago  (8/28/23) 1 yr ago  (8/2/23) 1 yr ago  (8/1/23) 2 yr ago  (11/14/22)    WBC  4.4 - 11.3 x10*3/uL 5.4 4.4 3.2 Low  4.0 Low  R 2.7 Low  R 3.4 Low  R 3.8 Low  R   RBC  4.00 - 5.20 x10*6/uL 4.25 4.39 3.89 Low  4.05 R 4.41 R 4.31 R 4.07 R   Hemoglobin  12.0 - 16.0 g/dL 13.7 14.1 12.1 13.1 14.3 14.1 13.2   Hematocrit  36.0 - 46.0 % 42.3 43.0 38.1 41.3 44.9 42.8 40.2   MCV  80 - 100 fL 100 98 98 102 High  102 High  99 99   MCH  26.0 - 34.0 pg 32.2 32.1 31.1       MCHC  32.0 - 36.0 g/dL 32.4 32.8 31.8 Low  31.7 Low  31.8 Low  32.9 32.8   RDW  11.5 - 14.5 % 12.6 13.0 13.0 13.2 13.9 13.8 13.5   Platelets  150 - 450 x10*3/uL 148 Low                ·  Results               Assessment and Plan:      Assessment and Plan:   Assessment:    1. Carcinoma right breast- September 2015. Triple negative, post chemotherapy and mastectomy     2. Carcinoma left breast- July 2009. Triple negative, post chemotherapy and mastectomy     3. Multiple medical conditions including attention deficit, insomnia, neuropathic pain    #4 chronic leukopenia which is stable    Plan: We will continue observation of breast cancer, clinically no evidence of disease. She will return for follow-up visit with labs in 6 months. She will call office with any questions/concerns in the meantime. She will continue care with primary care  provider and other healthcare providers as directed.          Total time =30 minutes. 50% or more of this time was spent in counseling and/or coordination of care including reviewing medical history/radiology/labs, examining patient, formulating outlined plan  with team, and discussing plan with patient/family.  I reviewed patient's chart including but not limited to labs, imaging, surgical/procedure notes, pathology, hospital notes, doctor's notes.

## 2025-08-19 DIAGNOSIS — E03.9 ACQUIRED HYPOTHYROIDISM: ICD-10-CM

## 2025-08-19 RX ORDER — LEVOTHYROXINE SODIUM 75 UG/1
75 TABLET ORAL DAILY
Qty: 90 TABLET | Refills: 0 | Status: SHIPPED | OUTPATIENT
Start: 2025-08-19 | End: 2026-08-19

## 2025-12-03 ENCOUNTER — APPOINTMENT (OUTPATIENT)
Dept: PRIMARY CARE | Facility: CLINIC | Age: 55
End: 2025-12-03
Payer: COMMERCIAL